# Patient Record
Sex: FEMALE | Race: WHITE | NOT HISPANIC OR LATINO | Employment: OTHER | URBAN - METROPOLITAN AREA
[De-identification: names, ages, dates, MRNs, and addresses within clinical notes are randomized per-mention and may not be internally consistent; named-entity substitution may affect disease eponyms.]

---

## 2017-01-11 DIAGNOSIS — F32.A DEPRESSION: Chronic | ICD-10-CM

## 2017-01-11 RX ORDER — MIRTAZAPINE 15 MG/1
TABLET, FILM COATED ORAL
Qty: 30 TABLET | Refills: 1 | Status: SHIPPED | OUTPATIENT
Start: 2017-01-11 | End: 2017-03-29 | Stop reason: SDUPTHER

## 2017-01-25 ENCOUNTER — OFFICE VISIT (OUTPATIENT)
Dept: INTERNAL MEDICINE | Age: 82
End: 2017-01-25

## 2017-01-25 VITALS
DIASTOLIC BLOOD PRESSURE: 80 MMHG | HEART RATE: 76 BPM | WEIGHT: 147 LBS | SYSTOLIC BLOOD PRESSURE: 128 MMHG | TEMPERATURE: 97.4 F | OXYGEN SATURATION: 95 % | HEIGHT: 65 IN | BODY MASS INDEX: 24.49 KG/M2

## 2017-01-25 DIAGNOSIS — K58.0 IRRITABLE BOWEL SYNDROME WITH DIARRHEA: Primary | Chronic | ICD-10-CM

## 2017-01-25 DIAGNOSIS — J30.9 ATOPIC RHINITIS: Chronic | ICD-10-CM

## 2017-01-25 DIAGNOSIS — K21.9 GASTROESOPHAGEAL REFLUX DISEASE, ESOPHAGITIS PRESENCE NOT SPECIFIED: Chronic | ICD-10-CM

## 2017-01-25 PROCEDURE — 99213 OFFICE O/P EST LOW 20 MIN: CPT | Performed by: INTERNAL MEDICINE

## 2017-01-25 RX ORDER — OLOPATADINE HYDROCHLORIDE 665 UG/1
2 SPRAY NASAL 2 TIMES DAILY
Qty: 1 BOTTLE | Refills: 5 | Status: SHIPPED | OUTPATIENT
Start: 2017-01-25 | End: 2018-06-12 | Stop reason: SDUPTHER

## 2017-01-25 RX ORDER — FLUTICASONE PROPIONATE 50 MCG
2 SPRAY, SUSPENSION (ML) NASAL DAILY
Qty: 1 EACH | Refills: 5 | Status: SHIPPED | OUTPATIENT
Start: 2017-01-25 | End: 2018-10-12 | Stop reason: SDUPTHER

## 2017-01-25 RX ORDER — LANSOPRAZOLE 30 MG/1
30 CAPSULE, DELAYED RELEASE ORAL EVERY MORNING
COMMUNITY
Start: 2017-01-25 | End: 2018-04-25

## 2017-01-25 NOTE — PROGRESS NOTES
"[place] B Fedde / 89 y.o. / female  01/25/2017    VITALS    Visit Vitals   • /80   • Pulse 76   • Temp 97.4 °F (36.3 °C)   • Ht 65\" (165.1 cm)   • Wt 147 lb (66.7 kg)   • SpO2 95%   • BMI 24.46 kg/m2     BP Readings from Last 3 Encounters:   01/25/17 128/80   10/25/16 124/60   04/25/16 120/62     Wt Readings from Last 3 Encounters:   01/25/17 147 lb (66.7 kg)   10/25/16 148 lb (67.1 kg)   04/25/16 144 lb 4.8 oz (65.5 kg)      Body mass index is 24.46 kg/(m^2).    CC:  Main reason(s) for today's visit: Irritable Bowel Syndrome and Heartburn      HPI:     ibs could not tolerate viberzi due to severe cramps  gerd stayed on lansoprazole instead of change to pantoprazole.  Needs refills on nasal sprays for allergies  ____________________________________________________________________    ASSESSMENT & PLAN:    Problem List Items Addressed This Visit        High    Irritable bowel syndrome with diarrhea - Primary (Chronic)    Current Assessment & Plan     Could not tolerate Viberzi.         Relevant Medications    Eluxadoline (VIBERZI) 75 MG tablet    hyoscyamine (LEVBID) 0.375 MG 12 hr tablet       Medium    Gastroesophageal reflux disease (Chronic)    Current Assessment & Plan     Decided to stay on on lansoprazole.         Relevant Medications    pantoprazole (PROTONIX) 40 MG EC tablet    hyoscyamine (LEVBID) 0.375 MG 12 hr tablet       Low    Atopic rhinitis (Chronic)    Relevant Medications    olopatadine (PATANASE) 0.6 % solution nasal solution    fluticasone (FLONASE) 50 MCG/ACT nasal spray        No orders of the defined types were placed in this encounter.      Summary/Discussion:     ·       No Follow-up on file.    No future appointments.    ____________________________________________________________________    REVIEW OF SYSTEMS    Review of Systems  As noted per HPI  Constitutional neg   GI no pain or blood in stool, decreased diarrhea  Other: As noted per HPI      PHYSICAL EXAMINATION    Physical " Exam  Constitutional  No distress   Cardiovascular Rate  normal . Rhythm: regular . Heart sounds:  normal    Abdomen soft and nontender  Psychiatric  Alert. Judgment and thought content normal. Mood normal      REVIEWED DATA:    Labs:   Lab Results   Component Value Date     05/10/2016    K 5.2 05/10/2016    AST 24 04/25/2016    ALT 12 04/25/2016    BUN 43 (H) 05/10/2016    CREATININE 1.94 (H) 05/10/2016    CREATININE 1.86 (H) 04/25/2016    CREATININE 1.85 (H) 05/05/2015    EGFRIFNONA 22 (L) 05/10/2016    EGFRIFAFRI 26 (L) 05/10/2016       Lab Results   Component Value Date    GLU 74 05/10/2016    GLU 87 04/25/2016    GLU 95 05/05/2015       Lab Results   Component Value Date    LDL 92 04/25/2016    HDL 58 04/25/2016    TRIG 130 04/25/2016    CHOLHDLRATIO 3.0 04/25/2016       Lab Results   Component Value Date    TSH 3.020 04/25/2016    FREET4 1.22 04/25/2016          Lab Results   Component Value Date    WBC 11-30 (A) 03/10/2016    HGB 12.0 05/05/2015     05/05/2015        Imaging:        Medical Tests:        Summary of old records / correspondence / consultant report:        Request outside records:          ALLERGIES:    Sulfa antibiotics; Azithromycin; Risedronate sodium; Ciprofloxacin hcl; Hydrocodone-acetaminophen; and Risedronate    MEDICATIONS:  Current Outpatient Prescriptions   Medication Sig Dispense Refill   • acetaminophen (TYLENOL) 325 MG tablet Take 650 mg by mouth every 6 (six) hours.     • amLODIPine (NORVASC) 10 MG tablet TAKE 1 TABLET NIGHTLY 90 tablet 1   • B Complex Vitamins (VITAMIN B COMPLEX) tablet Take  by mouth daily.     • Cranberry 1000 MG capsule Take  by mouth.     • fluticasone (FLONASE) 50 MCG/ACT nasal spray 2 sprays into each nostril Daily. 1 each 5   • furosemide (LASIX) 40 MG tablet 1 (ONE) TABLET, ORAL, DAILY  5   • hyoscyamine (LEVBID) 0.375 MG 12 hr tablet TAKE 1 TABLET BY MOUTH TWICE A DAY 60 tablet 5   • levETIRAcetam (KEPPRA) 250 MG tablet TAKE 1 TABLET TWICE  DAILY. 180 tablet 0   • levothyroxine (SYNTHROID, LEVOTHROID) 25 MCG tablet Take 1 tablet by mouth every morning before breakfast.     • mirtazapine (REMERON) 15 MG tablet TAKE 1 TABLET BY MOUTH IN THE EVENING AT BEDTIME 30 tablet 1   • Multiple Vitamins-Minerals (VITEYES AREDS ADVANCED) capsule Take  by mouth daily.     • olopatadine (PATANASE) 0.6 % solution nasal solution 2 sprays into each nostril 2 (Two) Times a Day. 1 bottle 5   • Probiotic Product (PROBIOTIC DAILY PO) Take  by mouth.     • simvastatin (ZOCOR) 20 MG tablet Take 1 tablet by mouth Every Night. 90 tablet 1   • Eluxadoline (VIBERZI) 75 MG tablet Take 1 tablet by mouth 2 (Two) Times a Day. 60 tablet 0   • pantoprazole (PROTONIX) 40 MG EC tablet Take 1 tablet by mouth Daily. 30 tablet 3     No current facility-administered medications for this visit.        [facility]    The following portions of the patient's history were reviewed and updated as appropriate: Allergies / Current Medications / Past Medical History / Surgical History / Social History / Family History    PROBLEM LIST:    Patient Active Problem List   Diagnosis   • Atopic rhinitis   • Anemia   • Stenosis of carotid artery   • Chronic kidney disease   • Depression   • Gastroesophageal reflux disease   • Hypertension   • Hyperlipidemia   • Hypothyroidism   • Irritable bowel syndrome with diarrhea   • Restless legs syndrome   • Vitamin D deficiency       PAST MEDICAL HX:    Past Medical History   Diagnosis Date   • Allergic    • Anemia    • Arthritis    • Cataract    • Cerebral hemorrhage 8/15/2014   • Closed fracture of proximal end of humerus 5/15/2015   • Depression    • Diverticulosis of intestine 3/10/2016   • GERD (gastroesophageal reflux disease)    • Headache    • HL (hearing loss)    • Hyperlipidemia    • Hypertension    • Irritable bowel syndrome    • Low back pain    • Osteopenia    • Renal insufficiency    • Seizures    • Stroke    • Tremor    • Urinary tract infection    • Visual  impairment      magular degeneration       PAST SURGICAL HX:    Past Surgical History   Procedure Laterality Date   • Cholecystectomy     • Eye surgery       cataracts removed   • Hysterectomy     • Tonsillectomy     • Colonoscopy         SOCIAL HX:    Social History     Social History   • Marital status:      Spouse name: N/A   • Number of children: N/A   • Years of education: N/A     Social History Main Topics   • Smoking status: Never Smoker   • Smokeless tobacco: Never Used   • Alcohol use No   • Drug use: No   • Sexual activity: No     Other Topics Concern   • None     Social History Narrative   • None       FAMILY HX:    Family History   Problem Relation Age of Onset   • Diabetes Brother    • Heart disease Mother

## 2017-01-25 NOTE — MR AVS SNAPSHOT
Lianna Hawthorne   1/25/2017 1:20 PM   Office Visit    Dept Phone:  [phone]   Encounter #:  [id number]    Provider:  Mike Ricketts MD   Department:  [facility]                Your Full Care Plan              Today's Medication Changes          These changes are accurate as of: 1/25/17  1:39 PM.  If you have any questions, ask your nurse or doctor.               Medication(s)that have changed:     fluticasone 50 MCG/ACT nasal spray   Commonly known as:  FLONASE   2 sprays into each nostril Daily.   What changed:  how much to take   Changed by:  Mike Ricketts MD       olopatadine 0.6 % solution nasal solution   Commonly known as:  PATANASE   2 sprays into each nostril 2 (Two) Times a Day.   What changed:    - how much to take  - when to take this   Changed by:  Mike Ricketts MD         Stop taking medication(s)listed here:     Eluxadoline 75 MG tablet   Commonly known as:  VIBERZI   Stopped by:  Mike Ricketts MD           pantoprazole 40 MG EC tablet   Commonly known as:  PROTONIX   Stopped by:  Mike Ricketts MD                Where to Get Your Medications      These medications were sent to [facility]/pharmacy #[id number] [place], [place] - [address] [facility] AT [facility] - [phone] [facility] [phone] [place] [address], [place] [address]     Phone:  [phone]     fluticasone 50 MCG/ACT nasal spray    olopatadine 0.6 % solution nasal solution                  Your Updated Medication List          This list is accurate as of: 1/25/17  1:39 PM.  Always use your most recent med list.                acetaminophen 325 MG tablet   Commonly known as:  TYLENOL       amLODIPine 10 MG tablet   Commonly known as:  NORVASC   TAKE 1 TABLET NIGHTLY       Cranberry 1000 MG capsule       fluticasone 50 MCG/ACT nasal spray   Commonly known as:  FLONASE   2 sprays into each nostril Daily.       furosemide 40 MG tablet   Commonly known as:  LASIX       hyoscyamine 0.375 MG  12 hr tablet   Commonly known as:  LEVBID   TAKE 1 TABLET BY MOUTH TWICE A DAY       lansoprazole 30 MG capsule   Commonly known as:  PREVACID       levETIRAcetam 250 MG tablet   Commonly known as:  KEPPRA   TAKE 1 TABLET TWICE DAILY.       levothyroxine 25 MCG tablet   Commonly known as:  SYNTHROID, LEVOTHROID       mirtazapine 15 MG tablet   Commonly known as:  REMERON   TAKE 1 TABLET BY MOUTH IN THE EVENING AT BEDTIME       olopatadine 0.6 % solution nasal solution   Commonly known as:  PATANASE   2 sprays into each nostril 2 (Two) Times a Day.       PROBIOTIC DAILY PO       simvastatin 20 MG tablet   Commonly known as:  ZOCOR   Take 1 tablet by mouth Every Night.       Vitamin B Complex tablet       VITEYES AREDS ADVANCED capsule               You Were Diagnosed With        Codes Comments    Irritable bowel syndrome with diarrhea    -  Primary ICD-10-CM: K58.0  ICD-9-CM: 564.1     Gastroesophageal reflux disease, esophagitis presence not specified     ICD-10-CM: K21.9  ICD-9-CM: 530.81     Atopic rhinitis     ICD-10-CM: J30.9  ICD-9-CM: 477.9       Instructions     None    Patient Instructions History      Upcoming Appointments     Visit Type Date Time Department    OFFICE VISIT 1/25/2017  1:20 PM [employer]    INIT MEDICARE WELLNESS VISIT 5/24/2017  1:20 PM [facility] [employer]      [employer] Signup     Our records indicate that you have an active [employer] account.    You can view your After Visit Summary by going to [employer] and logging in with your [employer] username and password.  If you don't have a [employer] username and password but a parent or guardian has access to your record, the parent or guardian should login with their own [employer] username and password and access your record to view the After Visit Summary.    If you have questions, you can email [email or web address] or call [phone] to talk to our [employer] staff.  Remember, [employer] is NOT to be used for  "urgent needs.  For medical emergencies, dial 911.               Other Info from Your Visit           Your Appointments     May 24, 2017  1:20 PM EDT   Initial Medicare Wellness Visit with Mike Ricketts MD   [facility] (--)    [address] [place] [id number] [phone]              Allergies     Sulfa Antibiotics  Rash    Azithromycin  Diarrhea    Risedronate Sodium      Other reaction(s): Other (See Comments)  ACHES    Ciprofloxacin Hcl  GI Intolerance    Hydrocodone-acetaminophen      Risedronate        Reason for Visit     Irritable Bowel Syndrome     Heartburn           Vital Signs     Blood Pressure Pulse Temperature Height Weight Oxygen Saturation    128/80 76 97.4 °F (36.3 °C) 65\" (165.1 cm) 147 lb (66.7 kg) 95%    Body Mass Index Smoking Status                24.46 kg/m2 Never Smoker          Problems and Diagnoses Noted     Nasal inflammation due to allergen    Acid reflux disease    Irritable bowel syndrome with diarrhea        "

## 2017-01-26 ENCOUNTER — TELEPHONE (OUTPATIENT)
Dept: INTERNAL MEDICINE | Age: 82
End: 2017-01-26

## 2017-02-02 DIAGNOSIS — E03.9 HYPOTHYROIDISM: ICD-10-CM

## 2017-02-02 RX ORDER — LEVOTHYROXINE SODIUM 0.03 MG/1
TABLET ORAL
Qty: 30 TABLET | Refills: 3 | Status: SHIPPED | OUTPATIENT
Start: 2017-02-02 | End: 2017-05-15 | Stop reason: SDUPTHER

## 2017-02-03 DIAGNOSIS — K58.0 IRRITABLE BOWEL SYNDROME WITH DIARRHEA: ICD-10-CM

## 2017-02-03 RX ORDER — HYOSCYAMINE SULFATE EXTENDED-RELEASE 0.38 MG/1
TABLET ORAL
Qty: 60 TABLET | Refills: 3 | Status: SHIPPED | OUTPATIENT
Start: 2017-02-03 | End: 2017-04-03 | Stop reason: SDUPTHER

## 2017-02-16 RX ORDER — AMLODIPINE BESYLATE 10 MG/1
TABLET ORAL
Qty: 90 TABLET | Refills: 1 | Status: SHIPPED | OUTPATIENT
Start: 2017-02-16 | End: 2017-08-25 | Stop reason: SDUPTHER

## 2017-02-16 RX ORDER — LEVETIRACETAM 250 MG/1
TABLET ORAL
Qty: 180 TABLET | Refills: 0 | Status: SHIPPED | OUTPATIENT
Start: 2017-02-16 | End: 2017-05-18 | Stop reason: SDUPTHER

## 2017-03-29 DIAGNOSIS — F32.A DEPRESSION, UNSPECIFIED DEPRESSION TYPE: ICD-10-CM

## 2017-03-29 RX ORDER — MIRTAZAPINE 15 MG/1
15 TABLET, FILM COATED ORAL NIGHTLY
Qty: 30 TABLET | Refills: 1 | Status: SHIPPED | OUTPATIENT
Start: 2017-03-29 | End: 2017-05-18 | Stop reason: SDUPTHER

## 2017-04-03 DIAGNOSIS — K58.0 IRRITABLE BOWEL SYNDROME WITH DIARRHEA: ICD-10-CM

## 2017-04-03 RX ORDER — HYOSCYAMINE SULFATE EXTENDED-RELEASE 0.38 MG/1
0.38 TABLET ORAL 2 TIMES DAILY
Qty: 180 TABLET | Refills: 1 | Status: SHIPPED | OUTPATIENT
Start: 2017-04-03 | End: 2017-11-02 | Stop reason: SDUPTHER

## 2017-04-09 DIAGNOSIS — E78.5 HYPERLIPIDEMIA, UNSPECIFIED HYPERLIPIDEMIA TYPE: ICD-10-CM

## 2017-04-10 RX ORDER — SIMVASTATIN 20 MG
TABLET ORAL
Qty: 90 TABLET | Refills: 1 | Status: SHIPPED | OUTPATIENT
Start: 2017-04-10 | End: 2017-10-08 | Stop reason: SDUPTHER

## 2017-04-15 ENCOUNTER — APPOINTMENT (OUTPATIENT)
Dept: GENERAL RADIOLOGY | Facility: HOSPITAL | Age: 82
End: 2017-04-15

## 2017-04-15 PROCEDURE — 73630 X-RAY EXAM OF FOOT: CPT | Performed by: GENERAL PRACTICE

## 2017-04-15 PROCEDURE — 73610 X-RAY EXAM OF ANKLE: CPT | Performed by: GENERAL PRACTICE

## 2017-05-15 DIAGNOSIS — E03.9 HYPOTHYROIDISM, UNSPECIFIED TYPE: ICD-10-CM

## 2017-05-15 RX ORDER — LEVOTHYROXINE SODIUM 0.03 MG/1
25 TABLET ORAL EVERY MORNING
Qty: 30 TABLET | Refills: 2 | Status: SHIPPED | OUTPATIENT
Start: 2017-05-15 | End: 2017-07-14 | Stop reason: SDUPTHER

## 2017-05-18 DIAGNOSIS — F32.A DEPRESSION, UNSPECIFIED DEPRESSION TYPE: ICD-10-CM

## 2017-05-18 RX ORDER — MIRTAZAPINE 15 MG/1
15 TABLET, FILM COATED ORAL NIGHTLY
Qty: 30 TABLET | Refills: 1 | Status: SHIPPED | OUTPATIENT
Start: 2017-05-18 | End: 2017-07-21 | Stop reason: SDUPTHER

## 2017-05-18 RX ORDER — LEVETIRACETAM 250 MG/1
TABLET ORAL
Qty: 180 TABLET | Refills: 0 | Status: SHIPPED | OUTPATIENT
Start: 2017-05-18 | End: 2017-08-12 | Stop reason: SDUPTHER

## 2017-05-24 ENCOUNTER — OFFICE VISIT (OUTPATIENT)
Dept: INTERNAL MEDICINE | Age: 82
End: 2017-05-24

## 2017-05-24 VITALS
BODY MASS INDEX: 24.66 KG/M2 | WEIGHT: 148 LBS | HEIGHT: 65 IN | SYSTOLIC BLOOD PRESSURE: 138 MMHG | HEART RATE: 80 BPM | DIASTOLIC BLOOD PRESSURE: 64 MMHG | TEMPERATURE: 97.5 F | OXYGEN SATURATION: 98 %

## 2017-05-24 DIAGNOSIS — Z23 NEED FOR VACCINATION FOR STREP PNEUMONIAE: ICD-10-CM

## 2017-05-24 DIAGNOSIS — Z00.00 MEDICARE ANNUAL WELLNESS VISIT, INITIAL: Primary | ICD-10-CM

## 2017-05-24 PROCEDURE — G0009 ADMIN PNEUMOCOCCAL VACCINE: HCPCS | Performed by: INTERNAL MEDICINE

## 2017-05-24 PROCEDURE — 90732 PPSV23 VACC 2 YRS+ SUBQ/IM: CPT | Performed by: INTERNAL MEDICINE

## 2017-05-24 PROCEDURE — G0438 PPPS, INITIAL VISIT: HCPCS | Performed by: INTERNAL MEDICINE

## 2017-07-14 DIAGNOSIS — E03.9 HYPOTHYROIDISM, UNSPECIFIED TYPE: ICD-10-CM

## 2017-07-14 RX ORDER — LEVOTHYROXINE SODIUM 0.03 MG/1
TABLET ORAL
Qty: 30 TABLET | Refills: 5 | Status: SHIPPED | OUTPATIENT
Start: 2017-07-14 | End: 2017-10-23 | Stop reason: SDUPTHER

## 2017-07-21 ENCOUNTER — TELEPHONE (OUTPATIENT)
Dept: INTERNAL MEDICINE | Age: 82
End: 2017-07-21

## 2017-07-21 DIAGNOSIS — F32.A DEPRESSION, UNSPECIFIED DEPRESSION TYPE: ICD-10-CM

## 2017-07-21 RX ORDER — MIRTAZAPINE 15 MG/1
15 TABLET, FILM COATED ORAL NIGHTLY
Qty: 30 TABLET | Refills: 1 | Status: SHIPPED | OUTPATIENT
Start: 2017-07-21 | End: 2017-09-20 | Stop reason: SDUPTHER

## 2017-07-21 NOTE — TELEPHONE ENCOUNTER
Katiuska Boykin is calling requesting a rx refill of mirtazapine 15mg that was prescribed to the pt for depression while at [facility] [place] approx 2 y/ago. P/Katiuska the prescribing provider will no longer refill the med & Katiuska is asking if  will take it over.    Pt is due for a refill.    Pls call Katiuska #[phone].

## 2017-07-21 NOTE — TELEPHONE ENCOUNTER
Pt's daughter called regarding Rx for mirtazapine 15mg. Previously prescribed by Dr. Marrero. Last RF was 5/18/2017 #30 +1. Pt's daughter is asking if Dr. Ricketts will take over Rx.    Please advise.    KD

## 2017-08-14 RX ORDER — LEVETIRACETAM 250 MG/1
TABLET ORAL
Qty: 180 TABLET | Refills: 0 | Status: SHIPPED | OUTPATIENT
Start: 2017-08-14 | End: 2017-11-15 | Stop reason: SDUPTHER

## 2017-08-22 ENCOUNTER — TELEPHONE (OUTPATIENT)
Dept: INTERNAL MEDICINE | Age: 82
End: 2017-08-22

## 2017-08-22 NOTE — TELEPHONE ENCOUNTER
Pt's daughter, Katiuska called stating pt is complaining of a possible uti. Urinating a lot and burning when urinating. She denied pt having any other symptoms.  Katiuska's # [phone]  Thanks SP

## 2017-08-23 ENCOUNTER — OFFICE VISIT (OUTPATIENT)
Dept: INTERNAL MEDICINE | Age: 82
End: 2017-08-23

## 2017-08-23 VITALS
OXYGEN SATURATION: 98 % | HEART RATE: 122 BPM | WEIGHT: 155.1 LBS | SYSTOLIC BLOOD PRESSURE: 124 MMHG | HEIGHT: 65 IN | DIASTOLIC BLOOD PRESSURE: 72 MMHG | TEMPERATURE: 97.9 F | BODY MASS INDEX: 25.84 KG/M2

## 2017-08-23 DIAGNOSIS — N18.4 CHRONIC KIDNEY DISEASE, STAGE 4 (SEVERE) (HCC): ICD-10-CM

## 2017-08-23 DIAGNOSIS — N39.0 ACUTE LOWER UTI (URINARY TRACT INFECTION): ICD-10-CM

## 2017-08-23 DIAGNOSIS — R35.0 URINARY FREQUENCY: Primary | ICD-10-CM

## 2017-08-23 LAB
BILIRUB BLD-MCNC: NEGATIVE MG/DL
CLARITY, POC: ABNORMAL
COLOR UR: YELLOW
GLUCOSE UR STRIP-MCNC: NEGATIVE MG/DL
KETONES UR QL: NEGATIVE
LEUKOCYTE EST, POC: ABNORMAL
NITRITE UR-MCNC: POSITIVE MG/ML
PH UR: 5.5 [PH] (ref 5–8)
PROT UR STRIP-MCNC: ABNORMAL MG/DL
RBC # UR STRIP: ABNORMAL /UL
SP GR UR: 1.01 (ref 1–1.03)
UROBILINOGEN UR QL: NORMAL

## 2017-08-23 PROCEDURE — 99214 OFFICE O/P EST MOD 30 MIN: CPT | Performed by: NURSE PRACTITIONER

## 2017-08-23 PROCEDURE — 81003 URINALYSIS AUTO W/O SCOPE: CPT | Performed by: NURSE PRACTITIONER

## 2017-08-23 RX ORDER — CEFDINIR 300 MG/1
300 CAPSULE ORAL DAILY
Qty: 7 CAPSULE | Refills: 0 | Status: SHIPPED | OUTPATIENT
Start: 2017-08-23 | End: 2017-08-30

## 2017-08-23 RX ORDER — CEFDINIR 300 MG/1
300 CAPSULE ORAL DAILY
Qty: 7 CAPSULE | Refills: 0 | Status: SHIPPED | OUTPATIENT
Start: 2017-08-23 | End: 2017-08-23 | Stop reason: SDUPTHER

## 2017-08-23 NOTE — PROGRESS NOTES
"Subjective   Lianna Hawthorne is a 89 y.o. female.     Urinary Frequency    This is a new problem. Episode onset: 5 days ago. The quality of the pain is described as aching. There has been no fever. She is not sexually active. Associated symptoms include frequency. Pertinent negatives include no chills, flank pain, hematuria, nausea or vomiting. Associated symptoms comments: nocturia. CKD stage 4    Sees Dr. Layton for nephrology, has stage IV kidney disease (7/18/17)- labs performed at that time.   Daughter reports that patient \"takes longer to process\", slightly confused since symptoms started on Friday.     The following portions of the patient's history were reviewed and updated as appropriate: allergies, current medications, past family history, past medical history, past social history, past surgical history and problem list.    Review of Systems   Constitutional: Positive for fatigue. Negative for chills and fever.   Gastrointestinal: Negative for nausea and vomiting.   Endocrine: Positive for polyuria (at nighttime, acute onset). Negative for polydipsia and polyphagia.   Genitourinary: Positive for frequency and pelvic pain. Negative for decreased urine volume, difficulty urinating, dysuria, enuresis, flank pain, hematuria, vaginal discharge and vaginal pain.   Musculoskeletal: Positive for back pain (chronic back pain).       Objective   Physical Exam   Constitutional: Vital signs are normal. She appears well-developed and well-nourished. She is cooperative. She does not appear ill. No distress.   Cardiovascular: Normal rate, regular rhythm and normal heart sounds.    No murmur heard.  Pulmonary/Chest: Effort normal and breath sounds normal.   Abdominal: There is no CVA tenderness.   Neurological: She is alert.   Skin: Skin is warm, dry and intact.   Psychiatric: She has a normal mood and affect. Her speech is normal and behavior is normal. Judgment and thought content normal. Cognition and memory are normal. "   Nursing note and vitals reviewed.      Assessment/Plan   Problems Addressed this Visit        Genitourinary    Chronic kidney disease      Other Visit Diagnoses     Urinary frequency    -  Primary    Relevant Orders    POCT urinalysis dipstick, automated (Completed)    Acute lower UTI (urinary tract infection)        Relevant Medications    cefdinir (OMNICEF) 300 MG capsule    Other Relevant Orders    Urine culture (clean catch)        1. Urinary frequency    - POCT urinalysis dipstick, automated    2. Acute lower UTI (urinary tract infection)  Medication dosage adjusted for decreased GFR ( GFR calculated at 20 ml/min per most recent creatinine level of 2.29 provided by daughter). Daughter and patient given strict ER indications for worsening symptoms or new symptoms. Instructed daughter to contact me via [employer] in 3-4 days to let me know how patient is doing.     - Urine culture (clean catch)  - cefdinir (OMNICEF) 300 MG capsule; Take 1 capsule by mouth Daily for 7 days.  Dispense: 7 capsule; Refill: 0    3. Chronic kidney disease, stage 4 (severe)

## 2017-08-23 NOTE — PATIENT INSTRUCTIONS
GO TO EMERGENCY ROOM FOR WORSENING PAIN, FEVER, VOMITING, LETHARGY/ INCREASED CONFUSION, INABILITY TO URINATE, STOPS PEEING.     Urinary Tract Infection, Adult  A urinary tract infection (UTI) is an infection of any part of the urinary tract, which includes the kidneys, ureters, bladder, and urethra. These organs make, store, and get rid of urine in the body. UTI can be a bladder infection (cystitis) or kidney infection (pyelonephritis).  CAUSES  This infection may be caused by fungi, viruses, or bacteria. Bacteria are the most common cause of UTIs. This condition can also be caused by repeated incomplete emptying of the bladder during urination.   RISK FACTORS  This condition is more likely to develop if:   · You ignore your need to urinate or hold urine for long periods of time.  · You do not empty your bladder completely during urination.  · You wipe back to front after urinating or having a bowel movement, if you are female.  · You are uncircumcised, if you are male.    · You are constipated.  · You have a urinary catheter that stays in place (indwelling).  · You have a weak defense (immune) system.  · You have a medical condition that affects your bowels, kidneys, or bladder.  · You have diabetes.  · You take antibiotic medicines frequently or for long periods of time, and the antibiotics no longer work well against certain types of infections (antibiotic resistance).  · You take medicines that irritate your urinary tract.  · You are exposed to chemicals that irritate your urinary tract.  · You are female.  SYMPTOMS   Symptoms of this condition include:   · Fever.    · Frequent urination or passing small amounts of urine frequently.    · Needing to urinate urgently.    · Pain or burning with urination.    · Urine that smells bad or unusual.    · Cloudy urine.    · Pain in the lower abdomen or back.    · Trouble urinating.    · Blood in the urine.    · Vomiting or being less hungry than normal.     · Diarrhea or  abdominal pain.    · Vaginal discharge, if you are female.  DIAGNOSIS  This condition is diagnosed with a medical history and physical exam. You will also need to provide a urine sample to test your urine. Other tests may be done, including:    · Blood tests.    · Sexually transmitted disease (STD) testing.     If you have had more than one UTI, a cystoscopy or imaging studies may be done to determine the cause of the infections.   TREATMENT   Treatment for this condition often includes a combination of two or more of the following:   · Antibiotic medicine.    · Other medicines to treat less common causes of UTI.    · Over-the-counter medicines to treat pain.    · Drinking enough water to stay hydrated.  HOME CARE INSTRUCTIONS  · Take over-the-counter and prescription medicines only as told by your health care provider.  · If you were prescribed an antibiotic, take it as told by your health care provider. Do not stop taking the antibiotic even if you start to feel better.  · Avoid alcohol, caffeine, tea, and carbonated beverages. They can irritate your bladder.  · Drink enough fluid to keep your urine clear or pale yellow.  · Keep all follow-up visits as told by your health care provider. This is important.  · Make sure to:    Empty your bladder often and completely. Do not hold urine for long periods of time.    Empty your bladder before and after sex.    Wipe from front to back after a bowel movement if you are female. Use each tissue one time when you wipe.  SEEK MEDICAL CARE IF:   · You have back pain.    · You have a fever.  · You feel nauseous or vomit.    · Your symptoms do not get better after 3 days.     · Your symptoms go away and then return.  SEEK IMMEDIATE MEDICAL CARE IF:   · You have severe back pain or lower abdominal pain.    · You are vomiting and cannot keep down any medicines or water.     This information is not intended to replace advice given to you by your health care provider. Make sure you  discuss any questions you have with your health care provider.     Document Released: 09/27/2006 Document Revised: 04/10/2017 Document Reviewed: 11/07/2016  Elsevier Interactive Patient Education ©2017 Elsevier Inc.

## 2017-08-25 DIAGNOSIS — I10 ESSENTIAL HYPERTENSION: Primary | ICD-10-CM

## 2017-08-25 NOTE — TELEPHONE ENCOUNTER
----- Message from Lianna Hawthorne sent at 8/25/2017  1:30 PM EDT -----  Regarding: Non-Urgent Medical Question  Contact: [phone]  Dear Rina Polo and Dr. Ricketts,  I am writing to follow-up on the appointment my mother, Lianna Hawthorne, had with Rina on Wednesday, the 23rd, regarding her urinary tract infection.  She started the medication prescribed for the UTI on Wednesday afternoon. She slept through the night Wednesday and had a better day on Thursday. She took her second pill on Thursday. Unfortunately, Thursday overnight for Mom saw the return of the frequent urination through the night with burning some of the time.  Understandably she is very tired today.  In speaking with her on the phone I hear no signs of confusion.      Mom tells me she has had three bowel movements here on Friday morning. They are soft but not yet at the level of diarrhea. If over the weekend she does go into diarrhea can she take Pepto Bismal or Immodium to stop the diarrhea or does she stop the medication?    When should Mom expect to see some relief in the UTI symptoms?    Thank you all for the care you provide my mother.    Katiuska Smith

## 2017-08-26 LAB
BACTERIA UR CULT: ABNORMAL
BACTERIA UR CULT: ABNORMAL
OTHER ANTIBIOTIC SUSC ISLT: ABNORMAL

## 2017-08-28 RX ORDER — AMLODIPINE BESYLATE 10 MG/1
TABLET ORAL
Qty: 90 TABLET | Refills: 1 | Status: SHIPPED | OUTPATIENT
Start: 2017-08-28 | End: 2018-01-05

## 2017-08-29 NOTE — TELEPHONE ENCOUNTER
I'm not sure except that this is a f/u from the pt's daughter regarding an appt with CURTIS Flynn. I don't know why it went to you.    KD

## 2017-09-06 ENCOUNTER — TELEPHONE (OUTPATIENT)
Dept: INTERNAL MEDICINE | Age: 82
End: 2017-09-06

## 2017-09-06 NOTE — TELEPHONE ENCOUNTER
Patient is on antibiotics for UTI. Patient has been having diarrhea  Friday and Tuesday evening. She has treated it with imodium and pepto bismol. Daughter charla like to know if there is anything else she should be doing? She patient continue to take antibiotics?

## 2017-09-06 NOTE — TELEPHONE ENCOUNTER
Called pt back to ask which antibiotic she was taking, and she stated that she was taking omnicef which was suggested by Dr Ricketts, but she was seen by angela on 8/23 and treated for UTI. Pt is finished with antibiotic and only had a couple bouts of diarrhea once Friday which was stopped by the pepto / imodium and once yesterday which also was stopped. Let pt know to keep hydrated. Daughter was a little worried about the pt because of her age and wondered if there was anything else she should be doing at this time. MARIANA

## 2017-09-11 NOTE — TELEPHONE ENCOUNTER
P/Katiuska(shalini) pt had diarrhea on Mukesh 9/10/17 before taking pepto bismol & then diarrhea stopped.    Pls advise.    Katiuska can be reached #[phone].

## 2017-09-12 ENCOUNTER — OFFICE VISIT (OUTPATIENT)
Dept: INTERNAL MEDICINE | Age: 82
End: 2017-09-12

## 2017-09-12 VITALS
HEIGHT: 65 IN | SYSTOLIC BLOOD PRESSURE: 158 MMHG | TEMPERATURE: 98.1 F | HEART RATE: 82 BPM | WEIGHT: 146 LBS | DIASTOLIC BLOOD PRESSURE: 60 MMHG | OXYGEN SATURATION: 98 % | BODY MASS INDEX: 24.32 KG/M2

## 2017-09-12 DIAGNOSIS — K58.0 IRRITABLE BOWEL SYNDROME WITH DIARRHEA: Primary | Chronic | ICD-10-CM

## 2017-09-12 PROCEDURE — 99214 OFFICE O/P EST MOD 30 MIN: CPT | Performed by: INTERNAL MEDICINE

## 2017-09-12 NOTE — PROGRESS NOTES
"Archer B Fedde / 89 y.o. / female  09/12/2017    VITALS    /60  Pulse 82  Temp 98.1 °F (36.7 °C)  Ht 65\" (165.1 cm)  Wt 146 lb (66.2 kg)  SpO2 98%  BMI 24.3 kg/m2  BP Readings from Last 3 Encounters:   09/12/17 158/60   08/23/17 124/72   05/24/17 138/64     Wt Readings from Last 3 Encounters:   09/12/17 146 lb (66.2 kg)   08/23/17 155 lb 1.6 oz (70.4 kg)   05/24/17 148 lb (67.1 kg)      Body mass index is 24.3 kg/(m^2).      CC:  Main reason(s) for today's visit: Diarrhea (since september first / had 3 other episodes)      HPI:     Symptom: diarrhea  -Location:   -Timing: random, intermittent  -Duration: >1 wk  -Context: h/o cefdinir use for UTI in August  -Quality: watery, non-bloody  -Severity: moderate/severe  -Modifying Factors: sometimes after eating, no particular food  -Associated S/S: no abd pain, melena, blood, wt loss, fever       Patient Care Team:  Mike Ricketts MD as PCP - General  Ester Villegas DPM as PCP - Claims Attributed  Brice Zambrano MD as Consulting Physician (Dermatology)  Ester Villegas DPM as Consulting Physician (Podiatry)  Chad Marino MD as Consulting Physician (Ophthalmology)  Jl Layton MD as Consulting Physician (Nephrology)  ____________________________________________________________________    ASSESSMENT & PLAN:    1. Irritable bowel syndrome with diarrhea  Handout given for IBS, counseled on diet, keep diary  Recommended using Metamucil daily  Counseled on stress mgmt  - Clostridium Difficile EIA  - Fecal Leukocytes  - Stool Culture       Summary/Discussion:     ·     No Follow-up on file.    Future Appointments  Date Time Provider Department Center   11/30/2017 9:20 AM Mike Ricketts MD [facility] None     ____________________________________________________________________    REVIEW OF SYSTEMS    Review of Systems  Other: As noted per HPI  No fever, loss  Psych increased stress due to son in [place]  GI no abd pain or blood   " negative    PHYSICAL EXAMINATION    Physical Exam   Constitutional: She does not have a sickly appearance. No distress.   Abdominal: Soft. Bowel sounds are normal. She exhibits no mass. Distention: mild. There is no tenderness. There is no rebound and no guarding. No hernia.   Neurological: She is alert.   Skin: No pallor.   Psychiatric: She has a normal mood and affect. Judgment normal.         REVIEWED DATA:    Labs:       Imaging:        Medical Tests:        Summary of old records / correspondence / consultant report:        Request outside records:       Allergies   Allergen Reactions   • Sulfa Antibiotics Rash   • Azithromycin Diarrhea   • Risedronate Sodium      Other reaction(s): Other (See Comments)  ACHES   • Ciprofloxacin Hcl GI Intolerance   • Hydrocodone-Acetaminophen    • Risedronate         Current Outpatient Prescriptions   Medication Sig Dispense Refill   • acetaminophen (TYLENOL) 325 MG tablet Take 650 mg by mouth every 6 (six) hours.     • amLODIPine (NORVASC) 10 MG tablet TAKE 1 TABLET NIGHTLY 90 tablet 1   • B Complex Vitamins (VITAMIN B COMPLEX) tablet Take  by mouth daily.     • Cranberry 1000 MG capsule Take  by mouth Daily.     • fluticasone (FLONASE) 50 MCG/ACT nasal spray 2 sprays into each nostril Daily. 1 each 5   • furosemide (LASIX) 40 MG tablet 1 (ONE) TABLET, ORAL, DAILY  5   • hyoscyamine (LEVBID) 0.375 MG 12 hr tablet Take 1 tablet by mouth 2 (Two) Times a Day. 180 tablet 1   • lansoprazole (PREVACID) 30 MG capsule Take 1 capsule by mouth Daily.     • levETIRAcetam (KEPPRA) 250 MG tablet TAKE 1 TABLET TWICE DAILY. 180 tablet 0   • levothyroxine (SYNTHROID, LEVOTHROID) 25 MCG tablet TAKE 1 TABLET BY MOUTH EVERY MORNING. 30 tablet 5   • mirtazapine (REMERON) 15 MG tablet Take 1 tablet by mouth Every Night. 30 tablet 1   • olopatadine (PATANASE) 0.6 % solution nasal solution 2 sprays into each nostril 2 (Two) Times a Day. 1 bottle 5   • Probiotic Product (PROBIOTIC DAILY PO) Take  by  mouth.     • simvastatin (ZOCOR) 20 MG tablet TAKE 1 TABLET EVERY NIGHT 90 tablet 1   • Multiple Vitamins-Minerals (VITEYES AREDS ADVANCED) capsule Take  by mouth daily.     • mupirocin (BACTROBAN) 2 % ointment APPLY SPARINGLY TO THE BIOPSY AREA 2 TIMES A DAY  3     No current facility-administered medications for this visit.        PFSH:     The following portions of the patient's history were reviewed and updated as appropriate: Allergies / Current Medications / Past Medical History / Surgical History / Social History / Family History    Patient Active Problem List   Diagnosis   • Atopic rhinitis   • Anemia   • Stenosis of carotid artery   • Chronic kidney disease   • Depression   • Gastroesophageal reflux disease   • Hypertension   • Hyperlipidemia   • Hypothyroidism   • Irritable bowel syndrome with diarrhea   • Restless legs syndrome   • Vitamin D deficiency       Past Medical History:   Diagnosis Date   • Anemia    • Arthritis    • Cataract    • Cerebral hemorrhage 8/15/2014   • Closed fracture of proximal end of humerus 5/15/2015   • Diverticulosis of intestine 3/10/2016   • GERD (gastroesophageal reflux disease)    • Hyperlipidemia    • Irritable bowel syndrome    • Low back pain    • Osteopenia    • Renal insufficiency    • Seizures    • Stroke    • Tremor    • Visual impairment     magular degeneration       Past Surgical History:   Procedure Laterality Date   • CHOLECYSTECTOMY     • COLONOSCOPY     • EYE SURGERY      cataracts removed   • HYSTERECTOMY     • TONSILLECTOMY         Social History     Social History   • Marital status:      Spouse name: N/A   • Number of children: 3   • Years of education: N/A     Occupational History   • Retired      [employer] Teacher     Social History Main Topics   • Smoking status: Never Smoker   • Smokeless tobacco: Never Used   • Alcohol use No   • Drug use: No   • Sexual activity: No     Other Topics Concern   • None     Social History Narrative       Family History    Problem Relation Age of Onset   • Diabetes Brother    • Heart disease Mother    • Breast cancer Mother    • Kidney disease Other    • Breast cancer Other    • Diabetes Other    • Hypertension Other          **Dragon Disclaimer:   Much of this encounter note is an electronic transcription/translation of spoken language to printed text. The electronic translation of spoken language may permit erroneous, or at times, nonsensical words or phrases to be inadvertently transcribed. Although I have reviewed the note for such errors, some may still exist.

## 2017-09-18 ENCOUNTER — TELEPHONE (OUTPATIENT)
Dept: INTERNAL MEDICINE | Age: 82
End: 2017-09-18

## 2017-09-18 LAB
BACTERIA SPEC CULT: NORMAL
BACTERIA SPEC CULT: NORMAL
C DIFF TOX A+B STL QL IA: NEGATIVE
CAMPYLOBACTER STL CULT: NORMAL
E COLI SXT STL QL IA: NEGATIVE
SALM + SHIG STL CULT: NORMAL
WBC STL QL MICRO: NORMAL
WBC STL QL MICRO: NORMAL

## 2017-09-18 NOTE — TELEPHONE ENCOUNTER
Stool studies negative for C diff or other signs of infection.  If needed take imodium otc up to twice daily for diarrhea.

## 2017-09-18 NOTE — TELEPHONE ENCOUNTER
Pt's daughter, Rachel called to let Dr Ricketts know the pt had another severe diarrhea bout this morning (one occurrence). Pt took 2 doses Pepto liquid at 10a and hasn't had another occurrence.  Rachel's # [phone]  Thanks SP

## 2017-09-20 DIAGNOSIS — F32.A DEPRESSION, UNSPECIFIED DEPRESSION TYPE: ICD-10-CM

## 2017-09-21 RX ORDER — MIRTAZAPINE 15 MG/1
TABLET, FILM COATED ORAL
Qty: 30 TABLET | Refills: 2 | Status: SHIPPED | OUTPATIENT
Start: 2017-09-21 | End: 2017-12-13 | Stop reason: SDUPTHER

## 2017-10-08 DIAGNOSIS — E78.5 HYPERLIPIDEMIA, UNSPECIFIED HYPERLIPIDEMIA TYPE: ICD-10-CM

## 2017-10-09 RX ORDER — SIMVASTATIN 20 MG
TABLET ORAL
Qty: 90 TABLET | Refills: 1 | Status: SHIPPED | OUTPATIENT
Start: 2017-10-09 | End: 2018-01-05

## 2017-10-23 DIAGNOSIS — E03.9 HYPOTHYROIDISM, UNSPECIFIED TYPE: ICD-10-CM

## 2017-10-23 RX ORDER — LEVOTHYROXINE SODIUM 0.03 MG/1
25 TABLET ORAL EVERY MORNING
Qty: 90 TABLET | Refills: 0 | Status: SHIPPED | OUTPATIENT
Start: 2017-10-23 | End: 2018-02-15 | Stop reason: SDUPTHER

## 2017-11-02 DIAGNOSIS — K58.0 IRRITABLE BOWEL SYNDROME WITH DIARRHEA: ICD-10-CM

## 2017-11-03 RX ORDER — HYOSCYAMINE SULFATE EXTENDED-RELEASE 0.38 MG/1
TABLET ORAL
Qty: 180 TABLET | Refills: 1 | Status: SHIPPED | OUTPATIENT
Start: 2017-11-03 | End: 2018-01-05

## 2017-11-07 ENCOUNTER — APPOINTMENT (OUTPATIENT)
Dept: RADIATION ONCOLOGY | Facility: HOSPITAL | Age: 82
End: 2017-11-07

## 2017-11-07 ENCOUNTER — CONSULT (OUTPATIENT)
Dept: RADIATION ONCOLOGY | Facility: HOSPITAL | Age: 82
End: 2017-11-07

## 2017-11-07 VITALS
TEMPERATURE: 97 F | WEIGHT: 146 LBS | OXYGEN SATURATION: 95 % | BODY MASS INDEX: 24.3 KG/M2 | SYSTOLIC BLOOD PRESSURE: 158 MMHG | HEART RATE: 85 BPM | DIASTOLIC BLOOD PRESSURE: 75 MMHG | RESPIRATION RATE: 16 BRPM

## 2017-11-07 DIAGNOSIS — C44.311 BASAL CELL CARCINOMA OF ALA NASI: Primary | ICD-10-CM

## 2017-11-07 PROCEDURE — 77263 THER RADIOLOGY TX PLNG CPLX: CPT | Performed by: RADIOLOGY

## 2017-11-07 PROCEDURE — 77334 RADIATION TREATMENT AID(S): CPT | Performed by: RADIOLOGY

## 2017-11-07 PROCEDURE — 77290 THER RAD SIMULAJ FIELD CPLX: CPT | Performed by: RADIOLOGY

## 2017-11-07 PROCEDURE — 99204 OFFICE O/P NEW MOD 45 MIN: CPT | Performed by: RADIOLOGY

## 2017-11-07 PROCEDURE — G0463 HOSPITAL OUTPT CLINIC VISIT: HCPCS | Performed by: RADIOLOGY

## 2017-11-07 NOTE — PROGRESS NOTES
Subjective     Brice Zambrano MD     Diagnosis Plan   1. Basal cell carcinoma of ala nasi           Mrs. Angelo is a very pleasant 89-year-old white female, a very good condition for age, who has had multiple skin cancers treated by Dr. Zambrano.  In late July of this year she presented with a mass in the right side of the nose and with a typical history of scabbing, path    was positive for basal cell carcinoma  lateral and deep margins were involved measuring about 0.9 x 0.9 cm.  3 months later  in late October, 10/25  There was noted recurrent disease  measuring approximately 12 mm.  He went over the options with her and  we have been asked to evaluate her for definitive radiation therapy in this setting.                                  Review of Systems   Constitutional: Positive for fatigue.   HENT: Positive for hearing loss.    Eyes: Positive for visual disturbance.   Respiratory: Negative.    Cardiovascular: Negative.    Gastrointestinal: Negative.    Endocrine: Negative.    Genitourinary: Negative.    Musculoskeletal: Negative.    Skin: Positive for wound.   Neurological: Negative.          Past Medical History:   Diagnosis Date   • Anemia    • Arthritis    • Basal cell carcinoma of ala nasi 11/7/2017   • Cataract    • Cerebral hemorrhage 8/15/2014   • Closed fracture of proximal end of humerus 5/15/2015   • Diverticulosis of intestine 3/10/2016   • GERD (gastroesophageal reflux disease)    • Hyperlipidemia    • Irritable bowel syndrome    • Low back pain    • Osteopenia    • Renal insufficiency    • Seizures    • Stroke    • Tremor    • Visual impairment     magular degeneration         Past Surgical History:   Procedure Laterality Date   • CHOLECYSTECTOMY     • COLONOSCOPY     • EYE SURGERY      cataracts removed   • HYSTERECTOMY     • TONSILLECTOMY           Social History     Social History   • Marital status:      Spouse name: N/A   • Number of children: 3   • Years of education: N/A      Occupational History   • Retired      [employer] Teacher     Social History Main Topics   • Smoking status: Never Smoker   • Smokeless tobacco: Never Used   • Alcohol use No   • Drug use: No   • Sexual activity: No     Other Topics Concern   • None     Social History Narrative    Lives in [place] apartment [place] the [place].         Family History   Problem Relation Age of Onset   • Diabetes Brother    • Heart disease Mother    • Breast cancer Mother    • Kidney disease Other    • Breast cancer Other    • Diabetes Other    • Hypertension Other           Objective    Physical Exam  12 mm basal cell carcinoma, recurrent at the right  ala nasi sulcus.    Current Outpatient Prescriptions on File Prior to Visit   Medication Sig Dispense Refill   • acetaminophen (TYLENOL) 325 MG tablet Take 650 mg by mouth every 6 (six) hours.     • amLODIPine (NORVASC) 10 MG tablet TAKE 1 TABLET NIGHTLY 90 tablet 1   • B Complex Vitamins (VITAMIN B COMPLEX) tablet Take  by mouth daily.     • Cranberry 1000 MG capsule Take  by mouth Daily.     • fluticasone (FLONASE) 50 MCG/ACT nasal spray 2 sprays into each nostril Daily. 1 each 5   • furosemide (LASIX) 40 MG tablet 1 (ONE) TABLET, ORAL, DAILY  5   • hyoscyamine (LEVBID) 0.375 MG 12 hr tablet TAKE 1 TABLET TWICE A  tablet 1   • lansoprazole (PREVACID) 30 MG capsule Take 1 capsule by mouth Daily.     • levETIRAcetam (KEPPRA) 250 MG tablet TAKE 1 TABLET TWICE DAILY. 180 tablet 0   • levothyroxine (SYNTHROID, LEVOTHROID) 25 MCG tablet Take 1 tablet by mouth Every Morning. 90 tablet 0   • mirtazapine (REMERON) 15 MG tablet TAKE 1 TABLET BY MOUTH EVERY DAY IN THE EVENING 30 tablet 2   • Multiple Vitamins-Minerals (VITEYES AREDS ADVANCED) capsule Take  by mouth daily.     • mupirocin (BACTROBAN) 2 % ointment APPLY SPARINGLY TO THE BIOPSY AREA 2 TIMES A DAY  3   • olopatadine (PATANASE) 0.6 % solution nasal solution 2 sprays into each nostril 2 (Two) Times a Day. 1 bottle 5   • Probiotic Product  (PROBIOTIC DAILY PO) Take  by mouth.     • simvastatin (ZOCOR) 20 MG tablet TAKE 1 TABLET EVERY NIGHT 90 tablet 1     No current facility-administered medications on file prior to visit.        ALLERGIES:    Allergies   Allergen Reactions   • Sulfa Antibiotics Rash   • Azithromycin Diarrhea   • Risedronate Sodium      Other reaction(s): Other (See Comments)  ACHES   • Ciprofloxacin Hcl GI Intolerance   • Hydrocodone-Acetaminophen    • Risedronate        /75  Pulse 85  Temp 97 °F (36.1 °C) (Oral)   Resp 16  Wt 146 lb (66.2 kg)  SpO2 95%  BMI 24.3 kg/m2     No flowsheet data found.      Assessment/Plan   12 mm basal cell carcinoma, recurrent at the right  ala nasi sulcus.  We started the treatment planning process this morning with mask construction and CT simulation. We will have a dot decimal bolus constructed followed by CT planning.  My dose aim would be approximately 66 Gy in 37 fractions.  We would likely start her course of therapy next 7-10 days.  She and her daughter had many good questions which I believe were answered to their satisfaction.we went over the likely side effects to include skin erythema,nasal congestion, nasal soreness, possible bleeding, with resolution of all side effects at approximately the 8 week gianluca, post tx.               I spent greater than 45 minutes and face-to-face time with the patient and her daughter, and greater than 40 minutes of that time was spent in counseling and coordination of care including discussion of the indications, goals, logistics, alternatives, risks both common and rare, as well as surveillance potential outcomes.        Lavell Merlos MD

## 2017-11-15 DIAGNOSIS — Z86.79: Primary | ICD-10-CM

## 2017-11-15 RX ORDER — LEVETIRACETAM 250 MG/1
TABLET ORAL
Qty: 180 TABLET | Refills: 0 | Status: SHIPPED | OUTPATIENT
Start: 2017-11-15 | End: 2018-01-05

## 2017-11-22 PROCEDURE — 77295 3-D RADIOTHERAPY PLAN: CPT | Performed by: RADIOLOGY

## 2017-11-30 ENCOUNTER — OFFICE VISIT (OUTPATIENT)
Dept: INTERNAL MEDICINE | Age: 82
End: 2017-11-30

## 2017-11-30 VITALS
DIASTOLIC BLOOD PRESSURE: 66 MMHG | OXYGEN SATURATION: 98 % | HEIGHT: 65 IN | TEMPERATURE: 97.9 F | HEART RATE: 70 BPM | SYSTOLIC BLOOD PRESSURE: 144 MMHG | WEIGHT: 146 LBS | BODY MASS INDEX: 24.32 KG/M2

## 2017-11-30 DIAGNOSIS — F32.A DEPRESSION, UNSPECIFIED DEPRESSION TYPE: Chronic | ICD-10-CM

## 2017-11-30 DIAGNOSIS — K21.9 GASTROESOPHAGEAL REFLUX DISEASE, ESOPHAGITIS PRESENCE NOT SPECIFIED: Chronic | ICD-10-CM

## 2017-11-30 DIAGNOSIS — E78.2 MIXED HYPERLIPIDEMIA: Primary | Chronic | ICD-10-CM

## 2017-11-30 DIAGNOSIS — I10 ESSENTIAL HYPERTENSION: Chronic | ICD-10-CM

## 2017-11-30 DIAGNOSIS — E03.9 HYPOTHYROIDISM, UNSPECIFIED TYPE: Chronic | ICD-10-CM

## 2017-11-30 LAB
CHOLEST SERPL-MCNC: 153 MG/DL (ref 0–200)
CHOLEST/HDLC SERPL: 3 {RATIO}
HDLC SERPL-MCNC: 51 MG/DL (ref 40–60)
LDLC SERPL CALC-MCNC: 71 MG/DL (ref 0–100)
T4 FREE SERPL-MCNC: 1.21 NG/DL (ref 0.93–1.7)
TRIGL SERPL-MCNC: 156 MG/DL (ref 0–150)
TSH SERPL DL<=0.005 MIU/L-ACNC: 3.56 MIU/ML (ref 0.27–4.2)
VLDLC SERPL CALC-MCNC: 31.2 MG/DL (ref 5–40)

## 2017-11-30 PROCEDURE — 77331 SPECIAL RADIATION DOSIMETRY: CPT | Performed by: RADIOLOGY

## 2017-11-30 PROCEDURE — 77334 RADIATION TREATMENT AID(S): CPT | Performed by: RADIOLOGY

## 2017-11-30 PROCEDURE — 99214 OFFICE O/P EST MOD 30 MIN: CPT | Performed by: INTERNAL MEDICINE

## 2017-11-30 PROCEDURE — 77470 SPECIAL RADIATION TREATMENT: CPT | Performed by: RADIOLOGY

## 2017-11-30 NOTE — PROGRESS NOTES
"[facility] MEDICINE  HECTOR GUERRERO M.D.      Lianna B Fedde / 89 y.o. / female  11/30/2017      MEDICATIONS  Current Outpatient Prescriptions   Medication Sig Dispense Refill   • acetaminophen (TYLENOL) 325 MG tablet Take 650 mg by mouth every 6 (six) hours.     • amLODIPine (NORVASC) 10 MG tablet TAKE 1 TABLET NIGHTLY 90 tablet 1   • B Complex Vitamins (VITAMIN B COMPLEX) tablet Take  by mouth daily.     • Cranberry 1000 MG capsule Take  by mouth Daily.     • fluticasone (FLONASE) 50 MCG/ACT nasal spray 2 sprays into each nostril Daily. 1 each 5   • furosemide (LASIX) 40 MG tablet 1 (ONE) TABLET, ORAL, DAILY  5   • hyoscyamine (LEVBID) 0.375 MG 12 hr tablet TAKE 1 TABLET TWICE A  tablet 1   • lansoprazole (PREVACID) 30 MG capsule Take 1 capsule by mouth Daily.     • levETIRAcetam (KEPPRA) 250 MG tablet TAKE 1 TABLET TWICE DAILY. 180 tablet 0   • levothyroxine (SYNTHROID, LEVOTHROID) 25 MCG tablet Take 1 tablet by mouth Every Morning. 90 tablet 0   • mirtazapine (REMERON) 15 MG tablet TAKE 1 TABLET BY MOUTH EVERY DAY IN THE EVENING 30 tablet 2   • Multiple Vitamins-Minerals (VITEYES AREDS ADVANCED) capsule Take  by mouth daily.     • olopatadine (PATANASE) 0.6 % solution nasal solution 2 sprays into each nostril 2 (Two) Times a Day. 1 bottle 5   • Probiotic Product (PROBIOTIC DAILY PO) Take  by mouth.     • simvastatin (ZOCOR) 20 MG tablet TAKE 1 TABLET EVERY NIGHT 90 tablet 1   • mupirocin (BACTROBAN) 2 % ointment APPLY SPARINGLY TO THE BIOPSY AREA 2 TIMES A DAY  3     No current facility-administered medications for this visit.          VITALS    /66  Pulse 70  Temp 97.9 °F (36.6 °C)  Ht 65\" (165.1 cm)  Wt 146 lb (66.2 kg)  SpO2 98%  BMI 24.3 kg/m2  BP Readings from Last 3 Encounters:   11/30/17 144/66   11/07/17 158/75   09/12/17 158/60     Wt Readings from Last 3 Encounters:   11/30/17 146 lb (66.2 kg)   11/07/17 146 lb (66.2 kg)   09/12/17 146 lb (66.2 kg)      Body mass index is 24.3 " kg/(m^2).    CC:  Main reason(s) for today's visit: Follow-up for hypertension, cholesterol and other related medical problems    HPI:     Chronic essential hypertension:  Since prior visit: compliant with medication(s), does not check blood pressure at home and denies significant problems with medication(s).  Most recent in-office blood pressure readings:   BP Readings from Last 3 Encounters:   11/30/17 144/66   11/07/17 158/75   09/12/17 158/60       Chronic hyperlipidemia:  Current therapy include simvastatin, denies problems with medication.    Most recent labs:   Lab Results   Component Value Date    LDL 92 04/25/2016    HDL 58 04/25/2016    TRIG 130 04/25/2016    CHOLHDLRATIO 3.0 04/25/2016       CKD followed by Dr. Layton. Denies recent change in renal function.    Patient Care Team:  Mike Ricketts MD as PCP - General  Ester Villegas DPM as PCP - Claims Attributed  Brice Zambrano MD as Consulting Physician (Dermatology)  Ester Villegas DPM as Consulting Physician (Podiatry)  Chad Marino MD as Consulting Physician (Ophthalmology)  Jl Layton MD as Consulting Physician (Nephrology)  ____________________________________________________________________    ASSESSMENT & PLAN:    Problem List Items Addressed This Visit     Hypertension (Chronic)    Current Assessment & Plan     Running higher here. Monitor home BP and send results in 1 week. Continue amlodipine 10 mg for now.          Relevant Medications    furosemide (LASIX) 40 MG tablet    amLODIPine (NORVASC) 10 MG tablet    Hyperlipidemia - Primary (Chronic)    Overview     Stable. Continue simvastatin 20 mg daily.          Relevant Medications    simvastatin (ZOCOR) 20 MG tablet    Other Relevant Orders    Lipid Panel With / Chol / HDL Ratio    Hypothyroidism (Chronic)    Overview     Stable. Continue current dose of levothyroxine.          Relevant Medications    levothyroxine (SYNTHROID, LEVOTHROID) 25 MCG tablet    Other  Relevant Orders    TSH+Free T4    Depression (Chronic)    Overview     Stable. Continue mirtazapine.          Relevant Medications    mirtazapine (REMERON) 15 MG tablet    Gastroesophageal reflux disease (Chronic)    Current Assessment & Plan     May stop lansoprazole (kidney concerns) and take Zantac 75/150 bid.          Relevant Medications    lansoprazole (PREVACID) 30 MG capsule    hyoscyamine (LEVBID) 0.375 MG 12 hr tablet        Orders Placed This Encounter   Procedures   • Lipid Panel With / Chol / HDL Ratio   • TSH+Free T4       Summary/Discussion:     ·     Return in about 4 months (around 3/30/2018) for ONLY Hypertension, Hypertension.    Future Appointments  Date Time Provider Department Center   4/2/2018 10:40 AM Mike Ricketts MD [facility] None       ____________________________________________________________________      REVIEW OF SYSTEMS    Review of Systems  As noted per HPI  Constitutional neg  Resp neg  CV neg    denies change in urination  Neuro neg for ha's      PHYSICAL EXAMINATION    Physical Exam  Constitutional  No distress  Cardiovascular Rate  normal . Rhythm: regular . Heart sounds:  normal  Pulmonary/Chest  Effort normal. Breath sounds:  normal  Psychiatric  Alert. Judgment and thought content normal. Mood normal    REVIEWED DATA:    Labs:   Lab Results   Component Value Date     05/10/2016    K 5.2 05/10/2016    AST 24 04/25/2016    ALT 12 04/25/2016    BUN 43 (H) 05/10/2016    CREATININE 1.94 (H) 05/10/2016    CREATININE 1.86 (H) 04/25/2016    CREATININE 1.85 (H) 05/05/2015    EGFRIFNONA 22 (L) 05/10/2016    EGFRIFAFRI 26 (L) 05/10/2016       Lab Results   Component Value Date    GLU 74 05/10/2016    GLU 87 04/25/2016    GLU 95 05/05/2015       Lab Results   Component Value Date    LDL 92 04/25/2016    HDL 58 04/25/2016    TRIG 130 04/25/2016    CHOLHDLRATIO 3.0 04/25/2016       Lab Results   Component Value Date    TSH 3.020 04/25/2016    FREET4 1.22 04/25/2016          Lab  Results   Component Value Date    WBC 11-30 (A) 03/10/2016    HGB 12.0 05/05/2015     05/05/2015        Imaging:        Medical Tests:        Summary of old records / correspondence / consultant report:        Request outside records:        ALLERGIES  Allergies   Allergen Reactions   • Sulfa Antibiotics Rash   • Azithromycin Diarrhea   • Risedronate Sodium      Other reaction(s): Other (See Comments)  ACHES   • Ciprofloxacin Hcl GI Intolerance   • Hydrocodone-Acetaminophen    • Risedronate         PFSH:     The following portions of the patient's history were reviewed and updated as appropriate: Allergies / Current Medications / Past Medical History / Surgical History / Social History / Family History    PROBLEM LIST   Patient Active Problem List   Diagnosis   • Atopic rhinitis   • Anemia   • Stenosis of carotid artery   • Chronic kidney disease   • Depression   • Gastroesophageal reflux disease   • Hypertension   • Hyperlipidemia   • Hypothyroidism   • Irritable bowel syndrome with diarrhea   • Restless legs syndrome   • Vitamin D deficiency   • Basal cell carcinoma of ala nasi       PAST MEDICAL HISTORY  Past Medical History:   Diagnosis Date   • Anemia    • Arthritis    • Basal cell carcinoma of ala nasi 11/7/2017   • Cataract    • Cerebral hemorrhage 8/15/2014   • Closed fracture of proximal end of humerus 5/15/2015   • Diverticulosis of intestine 3/10/2016   • GERD (gastroesophageal reflux disease)    • Hyperlipidemia    • Irritable bowel syndrome    • Low back pain    • Osteopenia    • Renal insufficiency    • Seizures    • Stroke    • Tremor    • Visual impairment     magular degeneration       SURGICAL HISTORY  Past Surgical History:   Procedure Laterality Date   • CHOLECYSTECTOMY     • COLONOSCOPY     • EYE SURGERY      cataracts removed   • HYSTERECTOMY     • TONSILLECTOMY         SOCIAL HISTORY  Social History     Social History   • Marital status:      Spouse name: N/A   • Number of  children: 3   • Years of education: N/A     Occupational History   • Retired      [employer] Teacher     Social History Main Topics   • Smoking status: Never Smoker   • Smokeless tobacco: Never Used   • Alcohol use No   • Drug use: No   • Sexual activity: No     Other Topics Concern   • None     Social History Narrative    Lives in [place] apartment [place] the [place].       FAMILY HISTORY  Family History   Problem Relation Age of Onset   • Diabetes Brother    • Heart disease Mother    • Breast cancer Mother    • Kidney disease Other    • Breast cancer Other    • Diabetes Other    • Hypertension Other          **Dragon Disclaimer:   Much of this encounter note is an electronic transcription/translation of spoken language to printed text. The electronic translation of spoken language may permit erroneous, or at times, nonsensical words or phrases to be inadvertently transcribed. Although I have reviewed the note for such errors, some may still exist.

## 2017-11-30 NOTE — ASSESSMENT & PLAN NOTE
Running higher here. Monitor home BP and send results in 1 week. Continue amlodipine 10 mg for now.

## 2017-12-01 ENCOUNTER — APPOINTMENT (OUTPATIENT)
Dept: RADIATION ONCOLOGY | Facility: HOSPITAL | Age: 82
End: 2017-12-01

## 2017-12-01 PROCEDURE — 77370 RADIATION PHYSICS CONSULT: CPT | Performed by: RADIOLOGY

## 2017-12-01 PROCEDURE — 77334 RADIATION TREATMENT AID(S): CPT | Performed by: RADIOLOGY

## 2017-12-01 PROCEDURE — 77280 THER RAD SIMULAJ FIELD SMPL: CPT | Performed by: RADIOLOGY

## 2017-12-01 PROCEDURE — 77300 RADIATION THERAPY DOSE PLAN: CPT | Performed by: RADIOLOGY

## 2017-12-01 PROCEDURE — 77412 RADIATION TX DELIVERY LVL 3: CPT | Performed by: RADIOLOGY

## 2017-12-01 PROCEDURE — 77427 RADIATION TX MANAGEMENT X5: CPT | Performed by: RADIOLOGY

## 2017-12-04 PROCEDURE — 77336 RADIATION PHYSICS CONSULT: CPT | Performed by: RADIOLOGY

## 2017-12-04 PROCEDURE — 77412 RADIATION TX DELIVERY LVL 3: CPT | Performed by: RADIOLOGY

## 2017-12-05 ENCOUNTER — RADIATION ONCOLOGY WEEKLY ASSESSMENT (OUTPATIENT)
Dept: RADIATION ONCOLOGY | Facility: HOSPITAL | Age: 82
End: 2017-12-05

## 2017-12-05 VITALS
DIASTOLIC BLOOD PRESSURE: 73 MMHG | HEIGHT: 65 IN | TEMPERATURE: 97.7 F | HEART RATE: 88 BPM | OXYGEN SATURATION: 96 % | BODY MASS INDEX: 24.99 KG/M2 | WEIGHT: 150 LBS | RESPIRATION RATE: 18 BRPM | SYSTOLIC BLOOD PRESSURE: 152 MMHG

## 2017-12-05 DIAGNOSIS — C44.311 BASAL CELL CARCINOMA OF ALA NASI: Primary | ICD-10-CM

## 2017-12-05 PROCEDURE — 77412 RADIATION TX DELIVERY LVL 3: CPT | Performed by: RADIOLOGY

## 2017-12-05 NOTE — PROGRESS NOTES
Physician Weekly Management Note    Diagnosis:     Diagnosis Plan   1. Basal cell carcinoma of ala nasi         RT Details:    Treatment #3/37    Notes on Treatment course, Films, Medical progress:    No questions or complaints, continue as planned.    Weekly Management:  Medication reviewed?   Yes  New medications given?   No  Problemlist reviewed?   Yes  Problem added?   No      Technical aspects reviewed:  Weekly OBI approved?   Yes  Weekly port films approved?   Yes  Change requests noted on port film?   No  Patient setup and plan reviewed?   Yes    Chart Reviewed:  Continue current treatment plan?   Yes  Treatment plan change requested?   No  CBC reviewed?   No  Concurrent Chemo?   No    Objective     Toxicities:   None     Review of Systems   As above    Vitals:    12/05/17 1153   BP: 152/73   Pulse: 88   Resp: 18   Temp: 97.7 °F (36.5 °C)   SpO2: 96%       No flowsheet data found.    Physical Exam  As above      Problem Summary List    Diagnosis:     Diagnosis Plan   1. Basal cell carcinoma of ala nasi       Pathology:       Past Medical History:   Diagnosis Date   • Anemia    • Arthritis    • Basal cell carcinoma of ala nasi 11/7/2017   • Cataract    • Cerebral hemorrhage 8/15/2014   • Closed fracture of proximal end of humerus 5/15/2015   • Diverticulosis of intestine 3/10/2016   • GERD (gastroesophageal reflux disease)    • Hyperlipidemia    • Irritable bowel syndrome    • Low back pain    • Osteopenia    • Renal insufficiency    • Seizures    • Stroke    • Tremor    • Visual impairment     magular degeneration         Past Surgical History:   Procedure Laterality Date   • CHOLECYSTECTOMY     • COLONOSCOPY     • EYE SURGERY      cataracts removed   • HYSTERECTOMY     • TONSILLECTOMY           Current Outpatient Prescriptions on File Prior to Visit   Medication Sig Dispense Refill   • acetaminophen (TYLENOL) 325 MG tablet Take 650 mg by mouth every 6 (six) hours.     • amLODIPine (NORVASC) 10 MG tablet TAKE  1 TABLET NIGHTLY 90 tablet 1   • B Complex Vitamins (VITAMIN B COMPLEX) tablet Take  by mouth daily.     • Cranberry 1000 MG capsule Take  by mouth Daily.     • fluticasone (FLONASE) 50 MCG/ACT nasal spray 2 sprays into each nostril Daily. 1 each 5   • furosemide (LASIX) 40 MG tablet 1 (ONE) TABLET, ORAL, DAILY  5   • hyoscyamine (LEVBID) 0.375 MG 12 hr tablet TAKE 1 TABLET TWICE A  tablet 1   • lansoprazole (PREVACID) 30 MG capsule Take 1 capsule by mouth Daily.     • levETIRAcetam (KEPPRA) 250 MG tablet TAKE 1 TABLET TWICE DAILY. 180 tablet 0   • levothyroxine (SYNTHROID, LEVOTHROID) 25 MCG tablet Take 1 tablet by mouth Every Morning. 90 tablet 0   • mirtazapine (REMERON) 15 MG tablet TAKE 1 TABLET BY MOUTH EVERY DAY IN THE EVENING 30 tablet 2   • Multiple Vitamins-Minerals (VITEYES AREDS ADVANCED) capsule Take  by mouth daily.     • mupirocin (BACTROBAN) 2 % ointment APPLY SPARINGLY TO THE BIOPSY AREA 2 TIMES A DAY  3   • olopatadine (PATANASE) 0.6 % solution nasal solution 2 sprays into each nostril 2 (Two) Times a Day. 1 bottle 5   • Probiotic Product (PROBIOTIC DAILY PO) Take  by mouth.     • simvastatin (ZOCOR) 20 MG tablet TAKE 1 TABLET EVERY NIGHT 90 tablet 1     No current facility-administered medications on file prior to visit.        Allergies   Allergen Reactions   • Sulfa Antibiotics Rash   • Azithromycin Diarrhea   • Risedronate Sodium      Other reaction(s): Other (See Comments)  ACHES   • Ciprofloxacin Hcl GI Intolerance   • Hydrocodone-Acetaminophen    • Risedronate         Primary care MD:    Mike Ricketts MD    Oncologist:    Seen and approved by:  Lavell Merlos MD  12/05/2017

## 2017-12-06 PROCEDURE — 77412 RADIATION TX DELIVERY LVL 3: CPT | Performed by: RADIOLOGY

## 2017-12-07 PROCEDURE — 77412 RADIATION TX DELIVERY LVL 3: CPT | Performed by: RADIOLOGY

## 2017-12-08 PROCEDURE — 77412 RADIATION TX DELIVERY LVL 3: CPT | Performed by: RADIOLOGY

## 2017-12-08 PROCEDURE — 77427 RADIATION TX MANAGEMENT X5: CPT | Performed by: RADIOLOGY

## 2017-12-11 ENCOUNTER — RADIATION ONCOLOGY WEEKLY ASSESSMENT (OUTPATIENT)
Dept: RADIATION ONCOLOGY | Facility: HOSPITAL | Age: 82
End: 2017-12-11

## 2017-12-11 VITALS
OXYGEN SATURATION: 96 % | HEART RATE: 81 BPM | SYSTOLIC BLOOD PRESSURE: 158 MMHG | DIASTOLIC BLOOD PRESSURE: 65 MMHG | RESPIRATION RATE: 16 BRPM

## 2017-12-11 DIAGNOSIS — C44.311 BASAL CELL CARCINOMA OF ALA NASI: Primary | ICD-10-CM

## 2017-12-11 PROCEDURE — 77336 RADIATION PHYSICS CONSULT: CPT | Performed by: RADIOLOGY

## 2017-12-11 PROCEDURE — 77412 RADIATION TX DELIVERY LVL 3: CPT | Performed by: RADIOLOGY

## 2017-12-11 NOTE — PROGRESS NOTES
Physician Weekly Management Note    Diagnosis:     Diagnosis Plan   1. Basal cell carcinoma of ala nasi         RT Details:   Treatment #7/37   Right nasal Ala    Notes on Treatment course, Films, Medical progress:  Demonstrated good tolerance no significant erythema no treatment-related bleeding or congestion or soreness at this time.  Continue as planned.    Weekly Management:  Medication reviewed?   Yes  New medications given?   No  Problemlist reviewed?   Yes  Problem added?   No      Technical aspects reviewed:  Weekly OBI approved?   Yes  Weekly port films approved?   Yes  Change requests noted on port film?   No  Patient setup and plan reviewed?   Yes    Chart Reviewed:  Continue current treatment plan?   Yes  Treatment plan change requested?   No  CBC reviewed?   No  Concurrent Chemo?   No    Objective     Toxicities:     None     Review of Systems   As above    Vitals:    12/11/17 1159   BP: 158/65   Pulse: 81   Resp: 16   SpO2: 96%       No flowsheet data found.    Physical Exam  As above      Problem Summary List    Diagnosis:     Diagnosis Plan   1. Basal cell carcinoma of ala nasi       Pathology:       Past Medical History:   Diagnosis Date   • Anemia    • Arthritis    • Basal cell carcinoma of ala nasi 11/7/2017   • Cataract    • Cerebral hemorrhage 8/15/2014   • Closed fracture of proximal end of humerus 5/15/2015   • Diverticulosis of intestine 3/10/2016   • GERD (gastroesophageal reflux disease)    • Hyperlipidemia    • Irritable bowel syndrome    • Low back pain    • Osteopenia    • Renal insufficiency    • Seizures    • Stroke    • Tremor    • Visual impairment     magular degeneration         Past Surgical History:   Procedure Laterality Date   • CHOLECYSTECTOMY     • COLONOSCOPY     • EYE SURGERY      cataracts removed   • HYSTERECTOMY     • TONSILLECTOMY           Current Outpatient Prescriptions on File Prior to Visit   Medication Sig Dispense Refill   • acetaminophen (TYLENOL) 325 MG tablet  Take 650 mg by mouth every 6 (six) hours.     • amLODIPine (NORVASC) 10 MG tablet TAKE 1 TABLET NIGHTLY 90 tablet 1   • B Complex Vitamins (VITAMIN B COMPLEX) tablet Take  by mouth daily.     • Cranberry 1000 MG capsule Take  by mouth Daily.     • fluticasone (FLONASE) 50 MCG/ACT nasal spray 2 sprays into each nostril Daily. 1 each 5   • furosemide (LASIX) 40 MG tablet 1 (ONE) TABLET, ORAL, DAILY  5   • hyoscyamine (LEVBID) 0.375 MG 12 hr tablet TAKE 1 TABLET TWICE A  tablet 1   • lansoprazole (PREVACID) 30 MG capsule Take 1 capsule by mouth Daily.     • levETIRAcetam (KEPPRA) 250 MG tablet TAKE 1 TABLET TWICE DAILY. 180 tablet 0   • levothyroxine (SYNTHROID, LEVOTHROID) 25 MCG tablet Take 1 tablet by mouth Every Morning. 90 tablet 0   • mirtazapine (REMERON) 15 MG tablet TAKE 1 TABLET BY MOUTH EVERY DAY IN THE EVENING 30 tablet 2   • Multiple Vitamins-Minerals (VITEYES AREDS ADVANCED) capsule Take  by mouth daily.     • mupirocin (BACTROBAN) 2 % ointment APPLY SPARINGLY TO THE BIOPSY AREA 2 TIMES A DAY  3   • olopatadine (PATANASE) 0.6 % solution nasal solution 2 sprays into each nostril 2 (Two) Times a Day. 1 bottle 5   • Probiotic Product (PROBIOTIC DAILY PO) Take  by mouth.     • simvastatin (ZOCOR) 20 MG tablet TAKE 1 TABLET EVERY NIGHT 90 tablet 1     No current facility-administered medications on file prior to visit.        Allergies   Allergen Reactions   • Sulfa Antibiotics Rash   • Azithromycin Diarrhea   • Risedronate Sodium      Other reaction(s): Other (See Comments)  ACHES   • Ciprofloxacin Hcl GI Intolerance   • Hydrocodone-Acetaminophen    • Risedronate         Primary care MD:    Mike Ricketts MD    Oncologist:     Seen and approved by:  Lavell Merlos MD  12/11/2017

## 2017-12-12 PROCEDURE — 77412 RADIATION TX DELIVERY LVL 3: CPT | Performed by: RADIOLOGY

## 2017-12-13 DIAGNOSIS — F32.A DEPRESSION, UNSPECIFIED DEPRESSION TYPE: ICD-10-CM

## 2017-12-13 PROCEDURE — 77412 RADIATION TX DELIVERY LVL 3: CPT | Performed by: RADIOLOGY

## 2017-12-13 RX ORDER — MIRTAZAPINE 15 MG/1
TABLET, FILM COATED ORAL
Qty: 30 TABLET | Refills: 2 | Status: SHIPPED | OUTPATIENT
Start: 2017-12-13 | End: 2018-01-05

## 2017-12-14 ENCOUNTER — DOCUMENTATION (OUTPATIENT)
Dept: RADIATION ONCOLOGY | Facility: HOSPITAL | Age: 82
End: 2017-12-14

## 2017-12-14 PROCEDURE — 77412 RADIATION TX DELIVERY LVL 3: CPT | Performed by: RADIOLOGY

## 2017-12-14 NOTE — PROGRESS NOTES
"Informed by Therapists Ms Hawthorne would like to speak to me . Ms Hawthorne c/o swelling to her right upper lip and difficulty eating because \"it feels funny with lip being swollen\" Recommended she try cold compress to lip this afternoon to see if it helps and I will follow up with her in the am  "

## 2017-12-15 PROCEDURE — 77427 RADIATION TX MANAGEMENT X5: CPT | Performed by: RADIOLOGY

## 2017-12-15 PROCEDURE — 77412 RADIATION TX DELIVERY LVL 3: CPT | Performed by: RADIOLOGY

## 2017-12-18 PROCEDURE — 77336 RADIATION PHYSICS CONSULT: CPT | Performed by: RADIOLOGY

## 2017-12-18 PROCEDURE — 77412 RADIATION TX DELIVERY LVL 3: CPT | Performed by: RADIOLOGY

## 2017-12-19 ENCOUNTER — RADIATION ONCOLOGY WEEKLY ASSESSMENT (OUTPATIENT)
Dept: RADIATION ONCOLOGY | Facility: HOSPITAL | Age: 82
End: 2017-12-19

## 2017-12-19 VITALS
RESPIRATION RATE: 16 BRPM | HEART RATE: 93 BPM | TEMPERATURE: 97.3 F | OXYGEN SATURATION: 98 % | DIASTOLIC BLOOD PRESSURE: 86 MMHG | SYSTOLIC BLOOD PRESSURE: 142 MMHG

## 2017-12-19 DIAGNOSIS — C44.311 BASAL CELL CARCINOMA OF SKIN OF NOSE: Primary | ICD-10-CM

## 2017-12-19 PROCEDURE — 77412 RADIATION TX DELIVERY LVL 3: CPT | Performed by: RADIOLOGY

## 2017-12-19 NOTE — PROGRESS NOTES
Physician Weekly Management Note    Diagnosis:     Diagnosis Plan   1. Basal cell carcinoma of skin of nose         RT Details:    Treatment #13/27  -  Right nose    Notes on Treatment course, Films, Medical progress:    Developing expected skin erythema and some occasional bleeding.  No pain.  Tight scab over primary site.  Upper lip mucosa within normal limits.  Continue as planned.    Weekly Management:  Medication reviewed?   Yes  New medications given?   No  Problemlist reviewed?   Yes  Problem added?   No      Technical aspects reviewed:  Weekly OBI approved?   Yes  Weekly port films approved?   Yes  Change requests noted on port film?   No  Patient setup and plan reviewed?   Yes    Chart Reviewed:  Continue current treatment plan?   Yes  Treatment plan change requested?   No  CBC reviewed?   No  Concurrent Chemo?   No    Objective     Toxicities:     As above     Review of Systems         Vitals:    12/19/17 1139   BP: 142/86   Pulse: 93   Resp: 16   Temp: 97.3 °F (36.3 °C)   SpO2: 98%       No flowsheet data found.    Physical Exam  As above      Problem Summary List    Diagnosis:     Diagnosis Plan   1. Basal cell carcinoma of skin of nose       Pathology:   As above    Past Medical History:   Diagnosis Date   • Anemia    • Arthritis    • Basal cell carcinoma of ala nasi 11/7/2017   • Cataract    • Cerebral hemorrhage 8/15/2014   • Closed fracture of proximal end of humerus 5/15/2015   • Diverticulosis of intestine 3/10/2016   • GERD (gastroesophageal reflux disease)    • Hyperlipidemia    • Irritable bowel syndrome    • Low back pain    • Osteopenia    • Renal insufficiency    • Seizures    • Stroke    • Tremor    • Visual impairment     magular degeneration         Past Surgical History:   Procedure Laterality Date   • CHOLECYSTECTOMY     • COLONOSCOPY     • EYE SURGERY      cataracts removed   • HYSTERECTOMY     • TONSILLECTOMY           Current Outpatient Prescriptions on File Prior to Visit    Medication Sig Dispense Refill   • acetaminophen (TYLENOL) 325 MG tablet Take 650 mg by mouth every 6 (six) hours.     • amLODIPine (NORVASC) 10 MG tablet TAKE 1 TABLET NIGHTLY 90 tablet 1   • B Complex Vitamins (VITAMIN B COMPLEX) tablet Take  by mouth daily.     • Cranberry 1000 MG capsule Take  by mouth Daily.     • fluticasone (FLONASE) 50 MCG/ACT nasal spray 2 sprays into each nostril Daily. 1 each 5   • furosemide (LASIX) 40 MG tablet 1 (ONE) TABLET, ORAL, DAILY  5   • hyoscyamine (LEVBID) 0.375 MG 12 hr tablet TAKE 1 TABLET TWICE A  tablet 1   • lansoprazole (PREVACID) 30 MG capsule Take 1 capsule by mouth Daily.     • levETIRAcetam (KEPPRA) 250 MG tablet TAKE 1 TABLET TWICE DAILY. 180 tablet 0   • levothyroxine (SYNTHROID, LEVOTHROID) 25 MCG tablet Take 1 tablet by mouth Every Morning. 90 tablet 0   • mirtazapine (REMERON) 15 MG tablet TAKE 1 TABLET BY MOUTH EVERY DAY IN THE EVENING 30 tablet 2   • Multiple Vitamins-Minerals (VITEYES AREDS ADVANCED) capsule Take  by mouth daily.     • mupirocin (BACTROBAN) 2 % ointment APPLY SPARINGLY TO THE BIOPSY AREA 2 TIMES A DAY  3   • olopatadine (PATANASE) 0.6 % solution nasal solution 2 sprays into each nostril 2 (Two) Times a Day. 1 bottle 5   • Probiotic Product (PROBIOTIC DAILY PO) Take  by mouth.     • simvastatin (ZOCOR) 20 MG tablet TAKE 1 TABLET EVERY NIGHT 90 tablet 1     No current facility-administered medications on file prior to visit.        Allergies   Allergen Reactions   • Sulfa Antibiotics Rash   • Azithromycin Diarrhea   • Risedronate Sodium      Other reaction(s): Other (See Comments)  ACHES   • Ciprofloxacin Hcl GI Intolerance   • Hydrocodone-Acetaminophen    • Risedronate        Primary care MD:    Mike Ricketts MD    Oncologist:     Seen and approved by:  Lavell Merlos MD  12/19/2017

## 2017-12-20 PROCEDURE — 77412 RADIATION TX DELIVERY LVL 3: CPT | Performed by: RADIOLOGY

## 2017-12-22 PROCEDURE — 77412 RADIATION TX DELIVERY LVL 3: CPT | Performed by: RADIOLOGY

## 2017-12-26 ENCOUNTER — RADIATION ONCOLOGY WEEKLY ASSESSMENT (OUTPATIENT)
Dept: RADIATION ONCOLOGY | Facility: HOSPITAL | Age: 82
End: 2017-12-26

## 2017-12-26 VITALS
RESPIRATION RATE: 16 BRPM | OXYGEN SATURATION: 98 % | HEART RATE: 91 BPM | TEMPERATURE: 97.2 F | DIASTOLIC BLOOD PRESSURE: 66 MMHG | SYSTOLIC BLOOD PRESSURE: 141 MMHG

## 2017-12-26 DIAGNOSIS — C44.311 BASAL CELL CARCINOMA OF SKIN OF NOSE: Primary | ICD-10-CM

## 2017-12-26 PROCEDURE — FACE2FACE: Performed by: RADIOLOGY

## 2017-12-26 NOTE — PROGRESS NOTES
Physician Weekly Management Note    Diagnosis:     Diagnosis Plan   1. Basal cell carcinoma of skin of nose         RT Details:   Treatment #16/37    Notes on Treatment course, Films, Medical progress:  Patient has developed significant erythema and drainage of her right eye.  She has also developed increasing erythema in the treatment portal over the nose.  She's having some tu congestion, which is expected.  I put in a call to Dr. Chad Marino her ophthalmologist and he will see her this afternoon at 1:30, to evaluate her follow for some topical support right eye.  We will hold her radiation therapy for the next week.    Weekly Management:  Medication reviewed?   Yes  New medications given?   No  Problemlist reviewed?   Yes  Problem added?   No      Technical aspects reviewed:  Weekly OBI approved?   Yes  Weekly port films approved?   Yes  Change requests noted on port film?   No  Patient setup and plan reviewed?   Yes    Chart Reviewed:  Continue current treatment plan?   Yes  Treatment plan change requested?   No  CBC reviewed?   No  Concurrent Chemo?   No    Objective     Toxicities:   As above     Review of Systems   As above    Vitals:    12/26/17 1040   BP: 141/66   Pulse: 91   Resp: 16   Temp: 97.2 °F (36.2 °C)   SpO2: 98%       No flowsheet data found.    Physical Exam  As above      Problem Summary List    Diagnosis:     Diagnosis Plan   1. Basal cell carcinoma of skin of nose       Pathology:       Past Medical History:   Diagnosis Date   • Anemia    • Arthritis    • Basal cell carcinoma of ala nasi 11/7/2017   • Cataract    • Cerebral hemorrhage 8/15/2014   • Closed fracture of proximal end of humerus 5/15/2015   • Diverticulosis of intestine 3/10/2016   • GERD (gastroesophageal reflux disease)    • Hyperlipidemia    • Irritable bowel syndrome    • Low back pain    • Osteopenia    • Renal insufficiency    • Seizures    • Stroke    • Tremor    • Visual impairment     magular degeneration         Past  Surgical History:   Procedure Laterality Date   • CHOLECYSTECTOMY     • COLONOSCOPY     • EYE SURGERY      cataracts removed   • HYSTERECTOMY     • TONSILLECTOMY           Current Outpatient Prescriptions on File Prior to Visit   Medication Sig Dispense Refill   • acetaminophen (TYLENOL) 325 MG tablet Take 650 mg by mouth every 6 (six) hours.     • amLODIPine (NORVASC) 10 MG tablet TAKE 1 TABLET NIGHTLY 90 tablet 1   • B Complex Vitamins (VITAMIN B COMPLEX) tablet Take  by mouth daily.     • Cranberry 1000 MG capsule Take  by mouth Daily.     • fluticasone (FLONASE) 50 MCG/ACT nasal spray 2 sprays into each nostril Daily. 1 each 5   • furosemide (LASIX) 40 MG tablet 1 (ONE) TABLET, ORAL, DAILY  5   • hyoscyamine (LEVBID) 0.375 MG 12 hr tablet TAKE 1 TABLET TWICE A  tablet 1   • lansoprazole (PREVACID) 30 MG capsule Take 1 capsule by mouth Daily.     • levETIRAcetam (KEPPRA) 250 MG tablet TAKE 1 TABLET TWICE DAILY. 180 tablet 0   • levothyroxine (SYNTHROID, LEVOTHROID) 25 MCG tablet Take 1 tablet by mouth Every Morning. 90 tablet 0   • mirtazapine (REMERON) 15 MG tablet TAKE 1 TABLET BY MOUTH EVERY DAY IN THE EVENING 30 tablet 2   • Multiple Vitamins-Minerals (VITEYES AREDS ADVANCED) capsule Take  by mouth daily.     • mupirocin (BACTROBAN) 2 % ointment APPLY SPARINGLY TO THE BIOPSY AREA 2 TIMES A DAY  3   • olopatadine (PATANASE) 0.6 % solution nasal solution 2 sprays into each nostril 2 (Two) Times a Day. 1 bottle 5   • Probiotic Product (PROBIOTIC DAILY PO) Take  by mouth.     • simvastatin (ZOCOR) 20 MG tablet TAKE 1 TABLET EVERY NIGHT 90 tablet 1     No current facility-administered medications on file prior to visit.        Allergies   Allergen Reactions   • Sulfa Antibiotics Rash   • Azithromycin Diarrhea   • Risedronate Sodium      Other reaction(s): Other (See Comments)  ACHES   • Ciprofloxacin Hcl GI Intolerance   • Hydrocodone-Acetaminophen    • Risedronate         Primary care MD:    Mike Ricketts,  MD    Oncologist:     Seen and approved by:  Lavell Merlos MD  12/26/2017

## 2017-12-28 ENCOUNTER — TELEPHONE (OUTPATIENT)
Dept: RADIATION ONCOLOGY | Facility: HOSPITAL | Age: 82
End: 2017-12-28

## 2017-12-28 NOTE — TELEPHONE ENCOUNTER
Received call from patient daughter that Ms Dsouza is having dryness of nostril not relieved with the saline spray. They tried glycerine and it burned so stopped. I advised using aquaphor and Katiuska voiced understanding. Ms Hawthorne is off on break due to reaction until we notify them to resume.

## 2018-01-01 DIAGNOSIS — E78.5 HYPERLIPIDEMIA, UNSPECIFIED HYPERLIPIDEMIA TYPE: ICD-10-CM

## 2018-01-01 DIAGNOSIS — J30.9 ATOPIC RHINITIS: Chronic | ICD-10-CM

## 2018-01-01 DIAGNOSIS — K58.0 IRRITABLE BOWEL SYNDROME WITH DIARRHEA: ICD-10-CM

## 2018-01-01 RX ORDER — SIMVASTATIN 20 MG
TABLET ORAL
Qty: 90 TABLET | Refills: 3 | Status: SHIPPED | OUTPATIENT
Start: 2018-01-01

## 2018-01-01 RX ORDER — HYOSCYAMINE SULFATE EXTENDED-RELEASE 0.38 MG/1
TABLET ORAL
Qty: 180 TABLET | Refills: 1 | Status: SHIPPED | OUTPATIENT
Start: 2018-01-01 | End: 2019-01-01 | Stop reason: SDUPTHER

## 2018-01-01 RX ORDER — FLUTICASONE PROPIONATE 50 MCG
SPRAY, SUSPENSION (ML) NASAL
Qty: 16 ML | Refills: 5 | Status: SHIPPED | OUTPATIENT
Start: 2018-01-01

## 2018-01-02 ENCOUNTER — APPOINTMENT (OUTPATIENT)
Dept: RADIATION ONCOLOGY | Facility: HOSPITAL | Age: 83
End: 2018-01-02

## 2018-01-05 ENCOUNTER — APPOINTMENT (OUTPATIENT)
Dept: PREADMISSION TESTING | Facility: HOSPITAL | Age: 83
End: 2018-01-05

## 2018-01-05 VITALS
DIASTOLIC BLOOD PRESSURE: 77 MMHG | WEIGHT: 144 LBS | TEMPERATURE: 98.3 F | RESPIRATION RATE: 16 BRPM | BODY MASS INDEX: 23.99 KG/M2 | OXYGEN SATURATION: 97 % | HEIGHT: 65 IN | SYSTOLIC BLOOD PRESSURE: 149 MMHG | HEART RATE: 75 BPM

## 2018-01-05 LAB
ANION GAP SERPL CALCULATED.3IONS-SCNC: 14.8 MMOL/L
BUN BLD-MCNC: 60 MG/DL (ref 8–23)
BUN/CREAT SERPL: 24.8 (ref 7–25)
CALCIUM SPEC-SCNC: 9.5 MG/DL (ref 8.2–9.6)
CHLORIDE SERPL-SCNC: 109 MMOL/L (ref 98–107)
CO2 SERPL-SCNC: 23.2 MMOL/L (ref 22–29)
CREAT BLD-MCNC: 2.42 MG/DL (ref 0.57–1)
DEPRECATED RDW RBC AUTO: 48.1 FL (ref 37–54)
ERYTHROCYTE [DISTWIDTH] IN BLOOD BY AUTOMATED COUNT: 13.3 % (ref 11.7–13)
GFR SERPL CREATININE-BSD FRML MDRD: 19 ML/MIN/1.73
GLUCOSE BLD-MCNC: 89 MG/DL (ref 65–99)
HCT VFR BLD AUTO: 36.8 % (ref 35.6–45.5)
HGB BLD-MCNC: 11.4 G/DL (ref 11.9–15.5)
MCH RBC QN AUTO: 30.6 PG (ref 26.9–32)
MCHC RBC AUTO-ENTMCNC: 31 G/DL (ref 32.4–36.3)
MCV RBC AUTO: 98.9 FL (ref 80.5–98.2)
PLATELET # BLD AUTO: 221 10*3/MM3 (ref 140–500)
PMV BLD AUTO: 10.8 FL (ref 6–12)
POTASSIUM BLD-SCNC: 5 MMOL/L (ref 3.5–5.2)
RBC # BLD AUTO: 3.72 10*6/MM3 (ref 3.9–5.2)
SODIUM BLD-SCNC: 147 MMOL/L (ref 136–145)
WBC NRBC COR # BLD: 7.29 10*3/MM3 (ref 4.5–10.7)

## 2018-01-05 PROCEDURE — 93010 ELECTROCARDIOGRAM REPORT: CPT | Performed by: INTERNAL MEDICINE

## 2018-01-05 PROCEDURE — 93005 ELECTROCARDIOGRAM TRACING: CPT

## 2018-01-05 PROCEDURE — 85027 COMPLETE CBC AUTOMATED: CPT | Performed by: OPHTHALMOLOGY

## 2018-01-05 PROCEDURE — 80048 BASIC METABOLIC PNL TOTAL CA: CPT | Performed by: OPHTHALMOLOGY

## 2018-01-05 PROCEDURE — 36415 COLL VENOUS BLD VENIPUNCTURE: CPT

## 2018-01-05 RX ORDER — HYOSCYAMINE SULFATE EXTENDED-RELEASE 0.38 MG/1
0.38 TABLET ORAL EVERY 12 HOURS PRN
COMMUNITY
End: 2019-01-01 | Stop reason: SDUPTHER

## 2018-01-05 RX ORDER — MIRTAZAPINE 15 MG/1
15 TABLET, FILM COATED ORAL NIGHTLY
COMMUNITY
End: 2018-04-25 | Stop reason: SDUPTHER

## 2018-01-05 RX ORDER — LEVETIRACETAM 250 MG/1
250 TABLET ORAL 2 TIMES DAILY
COMMUNITY
End: 2018-04-25

## 2018-01-05 RX ORDER — AMLODIPINE BESYLATE 10 MG/1
10 TABLET ORAL EVERY EVENING
COMMUNITY
End: 2018-04-25 | Stop reason: SDUPTHER

## 2018-01-05 RX ORDER — SIMVASTATIN 20 MG
20 TABLET ORAL NIGHTLY
COMMUNITY
End: 2019-01-01 | Stop reason: SDUPTHER

## 2018-01-05 RX ORDER — FUROSEMIDE 40 MG/1
40 TABLET ORAL EVERY MORNING
COMMUNITY

## 2018-01-05 NOTE — DISCHARGE INSTRUCTIONS
SURGERY 1-9-18  ARRIVAL TIME 5:45   APPT TIME CHANGE 1-2 DAYS AGO  DAUGHTER REPORTS    Take the following medications the morning of surgery with a small sip of water:    KEPPRA      General Instructions:  • Do not eat solid food after midnight the night before surgery.  • You may drink clear liquids day of surgery but must stop at least one hour before your hospital arrival time.  • It is beneficial for you to have a clear drink that contains carbohydrates the day of surgery.  We suggest a 12 to 20 ounce bottle of Gatorade or Powerade for non-diabetic patients or a 12 to 20 ounce bottle of G2 or Powerade Zero for diabetic patients. (Pediatric patients, are not advised to drink a 12 to 20 ounce carbohydrate drink)    Clear liquids are liquids you can see through.  Nothing red in color.     Plain water                               Sports drinks  Sodas                                   Gelatin (Jell-O)  Fruit juices without pulp such as white grape juice and apple juice  Popsicles that contain no fruit or yogurt  Tea or coffee (no cream or milk added)  Gatorade / Powerade  G2 / Powerade Zero    • Infants may have breast milk up to four hours before surgery.  • Infants drinking formula may drink formula up to six hours before surgery.   • Patients who avoid smoking, chewing tobacco and alcohol for 4 weeks prior to surgery have a reduced risk of post-operative complications.  Quit smoking as many days before surgery as you can.  • Do not smoke, use chewing tobacco or drink alcohol the day of surgery.   • If applicable bring your C-PAP/ BI-PAP machine.  • Bring any papers given to you in the doctor’s office.  • Wear clean comfortable clothes and socks.  • Do not wear contact lenses or make-up.  Bring a case for your glasses.   • Bring crutches or walker if applicable.  • Remove all piercings.  Leave jewelry and any other valuables at home.  • Hair extensions with metal clips must be removed prior to surgery.  • The  Pre-Admission Testing nurse will instruct you to bring medications if unable to obtain an accurate list in Pre-Admission Testing.        If you were given a blood bank ID arm band remember to bring it with you the day of surgery.    Preventing a Surgical Site Infection:  • For 2 to 3 days before surgery, avoid shaving with a razor because the razor can irritate skin and make it easier to develop an infection.  • The night prior to surgery sleep in a clean bed with clean clothing.  Do not allow pets to sleep with you.  • Shower on the morning of surgery using a fresh bar of anti-bacterial soap (such as Dial) and clean washcloth.  Dry with a clean towel and dress in clean clothing.  • Ask your surgeon if you will be receiving antibiotics prior to surgery.  • Make sure you, your family, and all healthcare providers clean their hands with soap and water or an alcohol based hand  before caring for you or your wound.    Day of surgery:  Upon arrival, a Pre-op nurse and Anesthesiologist will review your health history, obtain vital signs, and answer questions you may have.  The only belongings needed at this time will be your home medications and if applicable your C-PAP/BI-PAP machine.  If you are staying overnight your family can leave the rest of your belongings in the car and bring them to your room later.  A Pre-op nurse will start an IV and you may receive medication in preparation for surgery, including something to help you relax.  Your family will be able to see you in the Pre-op area.  While you are in surgery your family should notify the waiting room  if they leave the waiting room area and provide a contact phone number.    Please be aware that surgery does come with discomfort.  We want to make every effort to control your discomfort so please discuss any uncontrolled symptoms with your nurse.   Your doctor will most likely have prescribed pain medications.      If you are going home  after surgery you will receive individualized written care instructions before being discharged.  A responsible adult must drive you to and from the hospital on the day of your surgery and stay with you for 24 hours.    If you are staying overnight following surgery, you will be transported to your hospital room following the recovery period.  [facility] has all private rooms.    If you have any questions please call Pre-Admission Testing at [phone].  Deductibles and co-payments are collected on the day of service. Please be prepared to pay the required co-pay, deductible or deposit on the day of service as defined by your plan.

## 2018-01-09 ENCOUNTER — ANESTHESIA (OUTPATIENT)
Dept: PERIOP | Facility: HOSPITAL | Age: 83
End: 2018-01-09

## 2018-01-09 ENCOUNTER — HOSPITAL ENCOUNTER (OUTPATIENT)
Facility: HOSPITAL | Age: 83
Setting detail: HOSPITAL OUTPATIENT SURGERY
Discharge: HOME | End: 2018-01-09
Attending: OPHTHALMOLOGY | Admitting: OPHTHALMOLOGY

## 2018-01-09 ENCOUNTER — ANESTHESIA EVENT (OUTPATIENT)
Dept: PERIOP | Facility: HOSPITAL | Age: 83
End: 2018-01-09

## 2018-01-09 VITALS
SYSTOLIC BLOOD PRESSURE: 134 MMHG | HEART RATE: 74 BPM | WEIGHT: 143.5 LBS | OXYGEN SATURATION: 95 % | DIASTOLIC BLOOD PRESSURE: 88 MMHG | BODY MASS INDEX: 23.88 KG/M2 | TEMPERATURE: 97.5 F | RESPIRATION RATE: 16 BRPM

## 2018-01-09 PROCEDURE — 25010000002 PROPOFOL 10 MG/ML EMULSION: Performed by: NURSE ANESTHETIST, CERTIFIED REGISTERED

## 2018-01-09 RX ORDER — DIPHENHYDRAMINE HYDROCHLORIDE 50 MG/ML
12.5 INJECTION INTRAMUSCULAR; INTRAVENOUS
Status: DISCONTINUED | OUTPATIENT
Start: 2018-01-09 | End: 2018-01-09 | Stop reason: HOSPADM

## 2018-01-09 RX ORDER — HYDRALAZINE HYDROCHLORIDE 20 MG/ML
5 INJECTION INTRAMUSCULAR; INTRAVENOUS
Status: DISCONTINUED | OUTPATIENT
Start: 2018-01-09 | End: 2018-01-09 | Stop reason: HOSPADM

## 2018-01-09 RX ORDER — TETRACAINE HYDROCHLORIDE 5 MG/ML
SOLUTION OPHTHALMIC AS NEEDED
Status: DISCONTINUED | OUTPATIENT
Start: 2018-01-09 | End: 2018-01-09 | Stop reason: HOSPADM

## 2018-01-09 RX ORDER — ACETAMINOPHEN 325 MG/1
650 TABLET ORAL EVERY 4 HOURS PRN
Qty: 1 BOTTLE | Refills: 0 | Status: SHIPPED | OUTPATIENT
Start: 2018-01-09 | End: 2018-04-25 | Stop reason: SDUPTHER

## 2018-01-09 RX ORDER — ERYTHROMYCIN 5 MG/G
OINTMENT OPHTHALMIC AS NEEDED
Status: DISCONTINUED | OUTPATIENT
Start: 2018-01-09 | End: 2018-01-09 | Stop reason: HOSPADM

## 2018-01-09 RX ORDER — PROPOFOL 10 MG/ML
VIAL (ML) INTRAVENOUS CONTINUOUS PRN
Status: DISCONTINUED | OUTPATIENT
Start: 2018-01-09 | End: 2018-01-09 | Stop reason: SURG

## 2018-01-09 RX ORDER — SODIUM CHLORIDE, SODIUM LACTATE, POTASSIUM CHLORIDE, CALCIUM CHLORIDE 600; 310; 30; 20 MG/100ML; MG/100ML; MG/100ML; MG/100ML
9 INJECTION, SOLUTION INTRAVENOUS CONTINUOUS
Status: DISCONTINUED | OUTPATIENT
Start: 2018-01-09 | End: 2018-01-09 | Stop reason: HOSPADM

## 2018-01-09 RX ORDER — LIDOCAINE HYDROCHLORIDE 10 MG/ML
0.5 INJECTION, SOLUTION EPIDURAL; INFILTRATION; INTRACAUDAL; PERINEURAL ONCE AS NEEDED
Status: DISCONTINUED | OUTPATIENT
Start: 2018-01-09 | End: 2018-01-09 | Stop reason: HOSPADM

## 2018-01-09 RX ORDER — NALBUPHINE HCL 10 MG/ML
10 AMPUL (ML) INJECTION EVERY 4 HOURS PRN
Status: DISCONTINUED | OUTPATIENT
Start: 2018-01-09 | End: 2018-01-09 | Stop reason: HOSPADM

## 2018-01-09 RX ORDER — ACETAMINOPHEN 325 MG/1
650 TABLET ORAL ONCE AS NEEDED
Status: COMPLETED | OUTPATIENT
Start: 2018-01-09 | End: 2018-01-09

## 2018-01-09 RX ORDER — SODIUM CHLORIDE 0.9 % (FLUSH) 0.9 %
1-10 SYRINGE (ML) INJECTION AS NEEDED
Status: DISCONTINUED | OUTPATIENT
Start: 2018-01-09 | End: 2018-01-09 | Stop reason: HOSPADM

## 2018-01-09 RX ORDER — NALBUPHINE HCL 10 MG/ML
2 AMPUL (ML) INJECTION EVERY 4 HOURS PRN
Status: DISCONTINUED | OUTPATIENT
Start: 2018-01-09 | End: 2018-01-09 | Stop reason: HOSPADM

## 2018-01-09 RX ORDER — ERYTHROMYCIN 5 MG/G
OINTMENT OPHTHALMIC 2 TIMES DAILY
Qty: 3.5 G | Refills: 0 | Status: SHIPPED | OUTPATIENT
Start: 2018-01-09 | End: 2018-01-19

## 2018-01-09 RX ORDER — HYDROCODONE BITARTRATE AND ACETAMINOPHEN 5; 325 MG/1; MG/1
1 TABLET ORAL ONCE AS NEEDED
Status: DISCONTINUED | OUTPATIENT
Start: 2018-01-09 | End: 2018-01-09 | Stop reason: HOSPADM

## 2018-01-09 RX ORDER — FENTANYL CITRATE 50 UG/ML
25 INJECTION, SOLUTION INTRAMUSCULAR; INTRAVENOUS
Status: DISCONTINUED | OUTPATIENT
Start: 2018-01-09 | End: 2018-01-09 | Stop reason: HOSPADM

## 2018-01-09 RX ORDER — ACETAMINOPHEN 650 MG/1
650 SUPPOSITORY RECTAL ONCE AS NEEDED
Status: DISCONTINUED | OUTPATIENT
Start: 2018-01-09 | End: 2018-01-09 | Stop reason: SDUPTHER

## 2018-01-09 RX ORDER — MAGNESIUM HYDROXIDE 1200 MG/15ML
LIQUID ORAL AS NEEDED
Status: DISCONTINUED | OUTPATIENT
Start: 2018-01-09 | End: 2018-01-09 | Stop reason: HOSPADM

## 2018-01-09 RX ORDER — PROPOFOL 10 MG/ML
VIAL (ML) INTRAVENOUS AS NEEDED
Status: DISCONTINUED | OUTPATIENT
Start: 2018-01-09 | End: 2018-01-09 | Stop reason: SURG

## 2018-01-09 RX ORDER — ACETAMINOPHEN 325 MG/1
650 TABLET ORAL ONCE AS NEEDED
Status: DISCONTINUED | OUTPATIENT
Start: 2018-01-09 | End: 2018-01-09 | Stop reason: SDUPTHER

## 2018-01-09 RX ORDER — NALOXONE HCL 0.4 MG/ML
0.4 VIAL (ML) INJECTION AS NEEDED
Status: DISCONTINUED | OUTPATIENT
Start: 2018-01-09 | End: 2018-01-09 | Stop reason: HOSPADM

## 2018-01-09 RX ADMIN — ACETAMINOPHEN 650 MG: 325 TABLET ORAL at 08:57

## 2018-01-09 RX ADMIN — PROPOFOL 20 MG: 10 INJECTION, EMULSION INTRAVENOUS at 07:26

## 2018-01-09 RX ADMIN — SODIUM CHLORIDE, POTASSIUM CHLORIDE, SODIUM LACTATE AND CALCIUM CHLORIDE 9 ML/HR: 600; 310; 30; 20 INJECTION, SOLUTION INTRAVENOUS at 06:13

## 2018-01-09 RX ADMIN — PROPOFOL 20 MG: 10 INJECTION, EMULSION INTRAVENOUS at 07:23

## 2018-01-09 RX ADMIN — PROPOFOL 50 MCG/KG/MIN: 10 INJECTION, EMULSION INTRAVENOUS at 07:23

## 2018-01-09 NOTE — ANESTHESIA PREPROCEDURE EVALUATION
Anesthesia Evaluation     no history of anesthetic complications:  NPO Solid Status: > 8 hours       Airway   Mallampati: II  TM distance: >3 FB  Neck ROM: full  Dental      Pulmonary - negative pulmonary ROS and normal exam   (-) wheezes  Cardiovascular     ECG reviewed  Rhythm: regular    (+) hypertension, hyperlipidemia  (-) murmur, carotid bruits    ROS comment: NSR- poor quality ECG but P waves in precordial leads    Neuro/Psych  (+) seizures, CVA, psychiatric history,       ROS Comment: Nebulous hx of sz jalil stroke. Uncertain if it is GTC or otherwise, on kepra but no sz since.    No obvious post stroke deficit  GI/Hepatic/Renal/Endo    (+)  GERD, hypothyroidism,     Musculoskeletal     Abdominal  - normal exam   Substance History      OB/GYN          Other   (+) arthritis                                           Anesthesia Plan    ASA 3     general   (Listed as GA also discussed with pt and family MAC if possible.    89 yo- avoid benzo, minimize narcotics, consider TIVA or MAC w propofol and local.)  intravenous induction

## 2018-01-09 NOTE — PLAN OF CARE
Problem: Patient Care Overview (Adult)  Goal: Plan of Care Review  Outcome: Ongoing (interventions implemented as appropriate)   01/09/18 0545   Coping/Psychosocial Response Interventions   Plan Of Care Reviewed With patient;family   Patient Care Overview   Progress improving     Goal: Discharge Needs Assessment  Outcome: Ongoing (interventions implemented as appropriate)   01/09/18 0545   Discharge Needs Assessment   Concerns To Be Addressed denies needs/concerns at this time       Problem: Perioperative Period (Adult)  Goal: Signs and Symptoms of Listed Potential Problems Will be Absent or Manageable (Perioperative Period)  Outcome: Ongoing (interventions implemented as appropriate)   01/09/18 0545   Perioperative Period   Problems Assessed (Perioperative Period) all

## 2018-01-09 NOTE — PLAN OF CARE
Problem: Perioperative Period (Adult)  Goal: Signs and Symptoms of Listed Potential Problems Will be Absent or Manageable (Perioperative Period)  Outcome: Outcome(s) achieved Date Met: 01/09/18 01/09/18 0913   Perioperative Period   Problems Assessed (Perioperative Period) all   Problems Present (Perioperative Period) none

## 2018-01-09 NOTE — OP NOTE
`OPERATIVE NOTE    Patient Identification:  Name: Lianna Hawthorne  Age: 89 y.o.  Sex: female  :  1925  MRN: [id number]                                               Preoperative diagnosis: right lower eyelid cicatricial ectropion  Postoperative diagnosis: same  Procedure: right lower eyelid ectropion repair, right midface lift  Surgeon: Dr. Sanya Rivero  who was present and scrubbed throughout all critical portions of the operation  Assistants:  Alvin Shirley MD  Anesthesia: MAC  EBL: less than 50cc  Specimens: none    Description of the procedure:     The patient was taken to the operating room and placed on the table in the supine position, where anesthesia was induced. 2% lidocaine with epinephrine and 0.5% marcaine in a 1:1 fashion was injected over the surgical site, and the patient was prepped and draped in the usual manner for orbitofacial surgery.     Corneal protectors were placed in both eyes.     A 15 Bard-Dg blade incision was made at the right lateral canthus. Sharp dissection was carried down to the lateral orbital rim periosteum and was carried laterally 1.5cm. The inferior ramus of the lateral canthal tendon was identified and severed with sharp dissection.     A second incision was made 2 mm below the eyelash margin, across the entire horizontal extent of the eyelid. A sharp dissection plane was carried out below the orbicularis muscle layer until the entire lower lid was undermined. The orbital septum was opened horizontally, sharp dissection was carried further down to periosteum along the lateral aspect of the inferior orbital rim. A Tom elevator was used to lift periosteum from the bone and the superior edge of SMAS identified and isolated through sharp dissectionBleeding was controlled with electrocauterization. The SMAS was then elevated and attached to the inferior orbital rim using multiple interrupted 4-0 Prolene sutures. The skin muscle flap was then advanced superiorly  and temporally. The skin muscle flap was anchored superiorly with a 5-0 vicryl suture anchored to the lateral orbital rim periosteum.      A full-thickness en bloc excision of the lateral aspect of the tarsal plate was carried out with sharp dissection, and bleeding was controlled with electrocauterization. The cut edge of the tarsal plate was advanced to the lateral orbital rim periosteum, where it was sutured with 5-0 vicryl suture to the internal aspect of the lateral orbital rim periosteum. The skin  was closed with 5-0 fast absorbing suture.     The corneal protectors were removed and antibiotic ophthalmic ointment was placed over the surgical site.     The patient was then awakened and taken from the operating room in good condition, having tolerated the procedure well. There were no complications, and the estimated blood loss was less than 50 cc.

## 2018-01-09 NOTE — PLAN OF CARE
Problem: Patient Care Overview (Adult)  Goal: Plan of Care Review  Outcome: Outcome(s) achieved Date Met: 01/09/18 01/09/18 0913   Coping/Psychosocial Response Interventions   Plan Of Care Reviewed With patient;daughter   Patient Care Overview   Progress improving     Goal: Adult Individualization and Mutuality  Outcome: Outcome(s) achieved Date Met: 01/09/18    Goal: Discharge Needs Assessment  Outcome: Outcome(s) achieved Date Met: 01/09/18 01/09/18 0913   Discharge Needs Assessment   Concerns To Be Addressed no discharge needs identified

## 2018-01-09 NOTE — H&P
as above, no new meds/health problems since office visit    10 point review of systems negative, except pertaining to the HPI    Physical Exam   Constitutional: Appears well-developed.   HENT:   Head: Normocephalic.   Eyes:   RLL ectropion  Neck: Normal range of motion.   Cardiovascular: Normal rate.    Pulmonary/Chest: Effort normal.   Neurological: Alert.   Skin: Skin is warm.   Psychiatric: Normal mood and affect.

## 2018-01-09 NOTE — PLAN OF CARE
Problem: Patient Care Overview (Adult)  Goal: Adult Individualization and Mutuality  Outcome: Ongoing (interventions implemented as appropriate)   01/09/18 1393   Mutuality/Individual Preferences   What Information Would Help Us Give You More Personalized Care? hard of hearing, not wearing hearing aids

## 2018-01-09 NOTE — ANESTHESIA POSTPROCEDURE EVALUATION
Patient: Lianna MARSHALL Fedde    Procedure Summary     Date Anesthesia Start Anesthesia Stop Room / Location    01/09/18 0716 0811  [facility]  /  [facility]       Procedure Diagnosis Surgeon Provider    RIGHT LOWER LID ECTROPION REPAIR (Right Eye);  MID FACE LIFT (N/A Face) No diagnosis on file. MD Óscar Ordoñez MD          Anesthesia Type: general  Last vitals  BP   134/88 (01/09/18 0836)   Temp   36.4 °C (97.5 °F) (01/09/18 0812)   Pulse   74 (01/09/18 0836)   Resp   16 (01/09/18 0836)     SpO2   95 % (01/09/18 0836)     Post Anesthesia Care and Evaluation    Patient location during evaluation: bedside  Patient participation: complete - patient participated  Level of consciousness: awake  Pain score: 1  Pain management: adequate  Airway patency: patent  Anesthetic complications: No anesthetic complications    Cardiovascular status: acceptable  Respiratory status: acceptable  Hydration status: acceptable    Comments: --------------------            01/09/18 0836     --------------------   BP:       134/88     Pulse:      74       Resp:       16       Temp:                SpO2:      95%      --------------------

## 2018-01-18 PROCEDURE — 77427 RADIATION TX MANAGEMENT X5: CPT | Performed by: RADIOLOGY

## 2018-01-18 PROCEDURE — 77412 RADIATION TX DELIVERY LVL 3: CPT | Performed by: RADIOLOGY

## 2018-01-19 PROCEDURE — 77412 RADIATION TX DELIVERY LVL 3: CPT | Performed by: RADIOLOGY

## 2018-01-22 PROCEDURE — 77412 RADIATION TX DELIVERY LVL 3: CPT | Performed by: RADIOLOGY

## 2018-01-23 PROCEDURE — 77412 RADIATION TX DELIVERY LVL 3: CPT | Performed by: RADIOLOGY

## 2018-01-23 PROCEDURE — 77336 RADIATION PHYSICS CONSULT: CPT | Performed by: RADIOLOGY

## 2018-01-24 ENCOUNTER — RADIATION ONCOLOGY WEEKLY ASSESSMENT (OUTPATIENT)
Dept: RADIATION ONCOLOGY | Facility: HOSPITAL | Age: 83
End: 2018-01-24

## 2018-01-24 VITALS
HEART RATE: 82 BPM | SYSTOLIC BLOOD PRESSURE: 128 MMHG | OXYGEN SATURATION: 95 % | RESPIRATION RATE: 16 BRPM | TEMPERATURE: 97 F | DIASTOLIC BLOOD PRESSURE: 67 MMHG

## 2018-01-24 DIAGNOSIS — C44.311 BASAL CELL CARCINOMA OF SKIN OF NOSE: Primary | ICD-10-CM

## 2018-01-24 PROCEDURE — 77412 RADIATION TX DELIVERY LVL 3: CPT | Performed by: RADIOLOGY

## 2018-01-24 NOTE — PROGRESS NOTES
Physician Weekly Management Note    Diagnosis:     Diagnosis Plan   1. Basal cell carcinoma of skin of nose         RT Details:   Treatment #20/37    Notes on Treatment course, Films, Medical progress:  Patient's daughter asked to be seen today the patient has a little bit of irritation in the left medial canthus.  We reviewed our treatment portals well away from the canthus unlikely to related radiation.  Continue as planned.    Weekly Management:  Medication reviewed?   Yes  New medications given?   No  Problemlist reviewed?   Yes  Problem added?   No      Technical aspects reviewed:  Weekly OBI approved?   Yes  Weekly port films approved?   Yes  Change requests noted on port film?   No  Patient setup and plan reviewed?   Yes    Chart Reviewed:  Continue current treatment plan?   Yes  Treatment plan change requested?   No  CBC reviewed?   No  Concurrent Chemo?   No    Objective     Toxicities:   As above     Review of Systems   As above    Vitals:    01/24/18 1117   BP: 128/67   Pulse: 82   Resp: 16   Temp: 97 °F (36.1 °C)   SpO2: 95%       No flowsheet data found.    Physical Exam  As above      Problem Summary List    Diagnosis:     Diagnosis Plan   1. Basal cell carcinoma of skin of nose       Pathology:       Past Medical History:   Diagnosis Date   • Anemia    • Anemia    • Arthritis    • Basal cell carcinoma of ala nasi 11/7/2017    BASAL CELL CANCER RT SIDE NOSE WITH 15 RADIATION TX'S  NO SURGERY DONE   • Cerebral hemorrhage 8/15/2014   • Closed fracture of proximal end of humerus 5/15/2015   • Depression    • Disease of thyroid gland    • Diverticulosis of intestine 3/10/2016   • GERD (gastroesophageal reflux disease)    • Hyperlipidemia    • Hypertension    • Irritable bowel syndrome    • Low back pain    • Osteopenia    • Renal insufficiency     STAGE 1V   • Seizures    • Stroke     HEMORRATIC STROKE JAN 2013   • Visual impairment     magular degeneration         Past Surgical History:   Procedure  Laterality Date   • BROW LIFT N/A 1/9/2018    Procedure:  MID FACE LIFT;  Surgeon: Sanya Rivero MD;  [place] [facility] [place] [facility] [place]  Service:    • CHOLECYSTECTOMY     • COLONOSCOPY     • ECTROPION REPAIR Right 1/9/2018    Procedure: RIGHT LOWER LID ECTROPION REPAIR;  Surgeon: Sanya Rivero MD;  [place]  [facility] [place] [facility] [place]  Service:    • EYE SURGERY      cataracts removed   • HYSTERECTOMY     • TONSILLECTOMY           Current Outpatient Prescriptions on File Prior to Visit   Medication Sig Dispense Refill   • acetaminophen (TYLENOL) 325 MG tablet Take 650 mg by mouth every 6 (six) hours.     • acetaminophen (TYLENOL) 325 MG tablet Take 2 tablets by mouth Every 4 (Four) Hours As Needed for Mild Pain . 1 bottle 0   • amLODIPine (NORVASC) 10 MG tablet Take 10 mg by mouth Every Evening.     • B Complex Vitamins (VITAMIN B COMPLEX) tablet Take 1 tablet by mouth Daily.     • Cranberry 1000 MG capsule Take 500 mg by mouth Every Morning.     • fluticasone (FLONASE) 50 MCG/ACT nasal spray 2 sprays into each nostril Daily. (Patient taking differently: 2 sprays into each nostril Every Morning.) 1 each 5   • furosemide (LASIX) 40 MG tablet Take 40 mg by mouth Every Morning.     • hyoscyamine (LEVBID) 0.375 MG 12 hr tablet Take 0.375 mg by mouth Every 12 (Twelve) Hours As Needed for Cramping.     • lansoprazole (PREVACID) 30 MG capsule Take 30 mg by mouth Every Morning.     • levETIRAcetam (KEPPRA) 250 MG tablet Take 250 mg by mouth 2 (Two) Times a Day. HEMORRATIC STROKE 2013   HAS BEEN ON MED SINCE THEN     • levothyroxine (SYNTHROID, LEVOTHROID) 25 MCG tablet Take 1 tablet by mouth Every Morning. 90 tablet 0   • mirtazapine (REMERON) 15 MG tablet Take 15 mg by mouth Every Night.     • Multiple Vitamins-Minerals (VITEYES AREDS ADVANCED) capsule Take 1 tablet by mouth Daily.     • olopatadine (PATANASE) 0.6 % solution nasal solution 2 sprays into each nostril 2 (Two) Times a Day. 1 bottle 5   • Probiotic Product  (PROBIOTIC DAILY PO) Take 1 tablet by mouth Every Morning.     • simvastatin (ZOCOR) 20 MG tablet Take 20 mg by mouth Every Night.       No current facility-administered medications on file prior to visit.        Allergies   Allergen Reactions   • Sulfa Antibiotics Rash   • Azithromycin Diarrhea   • Risedronate Sodium      Other reaction(s): Other (See Comments)  ACHES   • Ciprofloxacin Hcl GI Intolerance   • Hydrocodone-Acetaminophen GI Intolerance         Primary care MD:    Mike Ricketts MD    Oncologist:     Seen and approved by:  Lavell Merlos MD  01/24/2018

## 2018-01-25 PROCEDURE — 77412 RADIATION TX DELIVERY LVL 3: CPT | Performed by: RADIOLOGY

## 2018-01-25 PROCEDURE — 77427 RADIATION TX MANAGEMENT X5: CPT | Performed by: RADIOLOGY

## 2018-01-26 PROCEDURE — 77412 RADIATION TX DELIVERY LVL 3: CPT | Performed by: RADIOLOGY

## 2018-01-29 PROCEDURE — 77412 RADIATION TX DELIVERY LVL 3: CPT | Performed by: RADIOLOGY

## 2018-01-30 ENCOUNTER — RADIATION ONCOLOGY WEEKLY ASSESSMENT (OUTPATIENT)
Dept: RADIATION ONCOLOGY | Facility: HOSPITAL | Age: 83
End: 2018-01-30

## 2018-01-30 VITALS
SYSTOLIC BLOOD PRESSURE: 132 MMHG | DIASTOLIC BLOOD PRESSURE: 60 MMHG | TEMPERATURE: 97.9 F | OXYGEN SATURATION: 94 % | RESPIRATION RATE: 16 BRPM | HEART RATE: 80 BPM

## 2018-01-30 DIAGNOSIS — C44.311 BASAL CELL CARCINOMA OF SKIN OF NOSE: Primary | ICD-10-CM

## 2018-01-30 PROCEDURE — 77412 RADIATION TX DELIVERY LVL 3: CPT | Performed by: RADIOLOGY

## 2018-01-30 PROCEDURE — 77336 RADIATION PHYSICS CONSULT: CPT | Performed by: RADIOLOGY

## 2018-01-30 NOTE — PROGRESS NOTES
Physician Weekly Management Note    Diagnosis:     Diagnosis Plan   1. Basal cell carcinoma of skin of nose         RT Details:  Treatment #24/37    Notes on Treatment course, Films, Medical progress:   Reports no intranasal pain or congestion.  No bleeding.  Erythema in the treatment portal on exam.  Patient reports fatigue.  Continue as planned.    Weekly Management:  Medication reviewed?   Yes  New medications given?   No  Problemlist reviewed?   Yes  Problem added?   No      Technical aspects reviewed:  Weekly OBI approved?   Yes  Weekly port films approved?   Yes  Change requests noted on port film?   No  Patient setup and plan reviewed?   Yes    Chart Reviewed:  Continue current treatment plan?   Yes  Treatment plan change requested?   No  CBC reviewed?   No  Concurrent Chemo?   No    Objective     Toxicities:    As above     Review of Systems   As above    Vitals:    01/30/18 1122   BP: 132/60   Pulse: 80   Resp: 16   Temp: 97.9 °F (36.6 °C)   SpO2: 94%       No flowsheet data found.    Physical Exam  As above      Problem Summary List    Diagnosis:     Diagnosis Plan   1. Basal cell carcinoma of skin of nose       Pathology:       Past Medical History:   Diagnosis Date   • Anemia    • Anemia    • Arthritis    • Basal cell carcinoma of ala nasi 11/7/2017    BASAL CELL CANCER RT SIDE NOSE WITH 15 RADIATION TX'S  NO SURGERY DONE   • Cerebral hemorrhage 8/15/2014   • Closed fracture of proximal end of humerus 5/15/2015   • Depression    • Disease of thyroid gland    • Diverticulosis of intestine 3/10/2016   • GERD (gastroesophageal reflux disease)    • Hyperlipidemia    • Hypertension    • Irritable bowel syndrome    • Low back pain    • Osteopenia    • Renal insufficiency     STAGE 1V   • Seizures    • Stroke     HEMORRATIC STROKE JAN 2013   • Visual impairment     magular degeneration         Past Surgical History:   Procedure Laterality Date   • BROW LIFT N/A 1/9/2018    Procedure:  MID FACE LIFT;  Surgeon:  Sanya Rivero MD;  Location: [facility] OR [facility] [place];  Service:    • CHOLECYSTECTOMY     • COLONOSCOPY     • ECTROPION REPAIR Right 1/9/2018    Procedure: RIGHT LOWER LID ECTROPION REPAIR;  Surgeon: Sanya Rivero MD;  Location: [facility] OR [facility] [place];  Service:    • EYE SURGERY      cataracts removed   • HYSTERECTOMY     • TONSILLECTOMY           Current Outpatient Prescriptions on File Prior to Visit   Medication Sig Dispense Refill   • acetaminophen (TYLENOL) 325 MG tablet Take 650 mg by mouth every 6 (six) hours.     • acetaminophen (TYLENOL) 325 MG tablet Take 2 tablets by mouth Every 4 (Four) Hours As Needed for Mild Pain . 1 bottle 0   • amLODIPine (NORVASC) 10 MG tablet Take 10 mg by mouth Every Evening.     • B Complex Vitamins (VITAMIN B COMPLEX) tablet Take 1 tablet by mouth Daily.     • Cranberry 1000 MG capsule Take 500 mg by mouth Every Morning.     • fluticasone (FLONASE) 50 MCG/ACT nasal spray 2 sprays into each nostril Daily. (Patient taking differently: 2 sprays into each nostril Every Morning.) 1 each 5   • furosemide (LASIX) 40 MG tablet Take 40 mg by mouth Every Morning.     • hyoscyamine (LEVBID) 0.375 MG 12 hr tablet Take 0.375 mg by mouth Every 12 (Twelve) Hours As Needed for Cramping.     • lansoprazole (PREVACID) 30 MG capsule Take 30 mg by mouth Every Morning.     • levETIRAcetam (KEPPRA) 250 MG tablet Take 250 mg by mouth 2 (Two) Times a Day. HEMORRATIC STROKE 2013   HAS BEEN ON MED SINCE THEN     • levothyroxine (SYNTHROID, LEVOTHROID) 25 MCG tablet Take 1 tablet by mouth Every Morning. 90 tablet 0   • mirtazapine (REMERON) 15 MG tablet Take 15 mg by mouth Every Night.     • Multiple Vitamins-Minerals (VITEYES AREDS ADVANCED) capsule Take 1 tablet by mouth Daily.     • olopatadine (PATANASE) 0.6 % solution nasal solution 2 sprays into each nostril 2 (Two) Times a Day. 1 bottle 5   • Probiotic Product (PROBIOTIC DAILY PO) Take 1 tablet by mouth Every Morning.     • simvastatin (ZOCOR) 20 MG  tablet Take 20 mg by mouth Every Night.       No current facility-administered medications on file prior to visit.        Allergies   Allergen Reactions   • Sulfa Antibiotics Rash   • Azithromycin Diarrhea   • Risedronate Sodium      Other reaction(s): Other (See Comments)  ACHES   • Ciprofloxacin Hcl GI Intolerance   • Hydrocodone-Acetaminophen GI Intolerance        Primary care MD:    Mike Ricketts MD    Oncologist:      Seen and approved by:  Lavell Merlos MD  01/30/2018

## 2018-01-31 ENCOUNTER — TELEPHONE (OUTPATIENT)
Dept: RADIATION ONCOLOGY | Facility: HOSPITAL | Age: 83
End: 2018-01-31

## 2018-01-31 NOTE — TELEPHONE ENCOUNTER
"Therapists received call from Ms Hawthorne's daughter stating Ms Hawthorne has a virus and will not return until MOnday if better. The home she is living in has \"an outbreak\" and is in quarantine.  "

## 2018-02-01 ENCOUNTER — APPOINTMENT (OUTPATIENT)
Dept: RADIATION ONCOLOGY | Facility: HOSPITAL | Age: 83
End: 2018-02-01

## 2018-02-01 ENCOUNTER — TELEPHONE (OUTPATIENT)
Dept: RADIATION ONCOLOGY | Facility: HOSPITAL | Age: 83
End: 2018-02-01

## 2018-02-02 PROCEDURE — 77412 RADIATION TX DELIVERY LVL 3: CPT | Performed by: RADIOLOGY

## 2018-02-05 ENCOUNTER — RADIATION ONCOLOGY WEEKLY ASSESSMENT (OUTPATIENT)
Dept: RADIATION ONCOLOGY | Facility: HOSPITAL | Age: 83
End: 2018-02-05

## 2018-02-05 VITALS
TEMPERATURE: 98.5 F | DIASTOLIC BLOOD PRESSURE: 64 MMHG | RESPIRATION RATE: 16 BRPM | SYSTOLIC BLOOD PRESSURE: 130 MMHG | HEART RATE: 60 BPM | OXYGEN SATURATION: 98 %

## 2018-02-05 DIAGNOSIS — C44.311 BASAL CELL CARCINOMA OF SKIN OF NOSE: Primary | ICD-10-CM

## 2018-02-05 PROCEDURE — 77412 RADIATION TX DELIVERY LVL 3: CPT | Performed by: RADIOLOGY

## 2018-02-05 PROCEDURE — 77427 RADIATION TX MANAGEMENT X5: CPT | Performed by: RADIOLOGY

## 2018-02-05 NOTE — PROGRESS NOTES
Physician Weekly Management Note    Diagnosis:     Diagnosis Plan   1. Basal cell carcinoma of skin of nose         RT Details:  #26/37    Notes on Treatment course, Films, Medical progress:    Continues to do well.  No significant bleeding, moving air well through the right nostril, no congestion, no pain.  Little bit of erythema on the right cheek, continue as planned.    Weekly Management:  Medication reviewed?   Yes  New medications given?   No  Problemlist reviewed?   Yes  Problem added?   No      Technical aspects reviewed:  Weekly OBI approved?   Yes  Weekly port films approved?   Yes  Change requests noted on port film?   No  Patient setup and plan reviewed?   Yes    Chart Reviewed:  Continue current treatment plan?   Yes  Treatment plan change requested?   No  CBC reviewed?   No  Concurrent Chemo?   No    Objective     Toxicities:    As above     Review of Systems   As above    Vitals:    02/05/18 1117   BP: 130/64   Pulse: 60   Resp: 16   Temp: 98.5 °F (36.9 °C)   SpO2: 98%       No flowsheet data found.    Physical Exam  As above      Problem Summary List    Diagnosis:     Diagnosis Plan   1. Basal cell carcinoma of skin of nose       Pathology:       Past Medical History:   Diagnosis Date   • Anemia    • Anemia    • Arthritis    • Basal cell carcinoma of ala nasi 11/7/2017    BASAL CELL CANCER RT SIDE NOSE WITH 15 RADIATION TX'S  NO SURGERY DONE   • Cerebral hemorrhage 8/15/2014   • Closed fracture of proximal end of humerus 5/15/2015   • Depression    • Disease of thyroid gland    • Diverticulosis of intestine 3/10/2016   • GERD (gastroesophageal reflux disease)    • Hyperlipidemia    • Hypertension    • Irritable bowel syndrome    • Low back pain    • Osteopenia    • Renal insufficiency     STAGE 1V   • Seizures    • Stroke     HEMORRATIC STROKE JAN 2013   • Visual impairment     magular degeneration         Past Surgical History:   Procedure Laterality Date   • BROW LIFT N/A 1/9/2018    Procedure:   MID FACE LIFT;  Surgeon: Sanya Rivero MD;  [place] [facility] [place] [facility] [place]  Service:    • CHOLECYSTECTOMY     • COLONOSCOPY     • ECTROPION REPAIR Right 1/9/2018    Procedure: RIGHT LOWER LID ECTROPION REPAIR;  Surgeon: Sanya Rivero MD;  [place] [facility] [place] [facility] [place]  Service:    • EYE SURGERY      cataracts removed   • HYSTERECTOMY     • TONSILLECTOMY           Current Outpatient Prescriptions on File Prior to Visit   Medication Sig Dispense Refill   • acetaminophen (TYLENOL) 325 MG tablet Take 650 mg by mouth every 6 (six) hours.     • acetaminophen (TYLENOL) 325 MG tablet Take 2 tablets by mouth Every 4 (Four) Hours As Needed for Mild Pain . 1 bottle 0   • amLODIPine (NORVASC) 10 MG tablet Take 10 mg by mouth Every Evening.     • B Complex Vitamins (VITAMIN B COMPLEX) tablet Take 1 tablet by mouth Daily.     • Cranberry 1000 MG capsule Take 500 mg by mouth Every Morning.     • fluticasone (FLONASE) 50 MCG/ACT nasal spray 2 sprays into each nostril Daily. (Patient taking differently: 2 sprays into each nostril Every Morning.) 1 each 5   • furosemide (LASIX) 40 MG tablet Take 40 mg by mouth Every Morning.     • hyoscyamine (LEVBID) 0.375 MG 12 hr tablet Take 0.375 mg by mouth Every 12 (Twelve) Hours As Needed for Cramping.     • lansoprazole (PREVACID) 30 MG capsule Take 30 mg by mouth Every Morning.     • levETIRAcetam (KEPPRA) 250 MG tablet Take 250 mg by mouth 2 (Two) Times a Day. HEMORRATIC STROKE 2013   HAS BEEN ON MED SINCE THEN     • levothyroxine (SYNTHROID, LEVOTHROID) 25 MCG tablet Take 1 tablet by mouth Every Morning. 90 tablet 0   • mirtazapine (REMERON) 15 MG tablet Take 15 mg by mouth Every Night.     • Multiple Vitamins-Minerals (VITEYES AREDS ADVANCED) capsule Take 1 tablet by mouth Daily.     • olopatadine (PATANASE) 0.6 % solution nasal solution 2 sprays into each nostril 2 (Two) Times a Day. 1 bottle 5   • Probiotic Product (PROBIOTIC DAILY PO) Take 1 tablet by mouth Every Morning.     •  simvastatin (ZOCOR) 20 MG tablet Take 20 mg by mouth Every Night.       No current facility-administered medications on file prior to visit.        Allergies   Allergen Reactions   • Sulfa Antibiotics Rash   • Azithromycin Diarrhea   • Risedronate Sodium      Other reaction(s): Other (See Comments)  ACHES   • Ciprofloxacin Hcl GI Intolerance   • Hydrocodone-Acetaminophen GI Intolerance       Primary care MD:    Mike Ricketts MD    Oncologist:     Seen and approved by:  Lavell Merlos MD  02/05/2018

## 2018-02-06 PROCEDURE — 77412 RADIATION TX DELIVERY LVL 3: CPT | Performed by: RADIOLOGY

## 2018-02-06 PROCEDURE — 77336 RADIATION PHYSICS CONSULT: CPT | Performed by: RADIOLOGY

## 2018-02-07 PROCEDURE — 77412 RADIATION TX DELIVERY LVL 3: CPT | Performed by: RADIOLOGY

## 2018-02-08 PROCEDURE — 77412 RADIATION TX DELIVERY LVL 3: CPT | Performed by: RADIOLOGY

## 2018-02-09 PROCEDURE — 77412 RADIATION TX DELIVERY LVL 3: CPT | Performed by: RADIOLOGY

## 2018-02-12 PROCEDURE — 77427 RADIATION TX MANAGEMENT X5: CPT | Performed by: RADIOLOGY

## 2018-02-12 PROCEDURE — 77412 RADIATION TX DELIVERY LVL 3: CPT | Performed by: RADIOLOGY

## 2018-02-13 ENCOUNTER — RADIATION ONCOLOGY WEEKLY ASSESSMENT (OUTPATIENT)
Dept: RADIATION ONCOLOGY | Facility: HOSPITAL | Age: 83
End: 2018-02-13

## 2018-02-13 VITALS
SYSTOLIC BLOOD PRESSURE: 144 MMHG | OXYGEN SATURATION: 99 % | HEART RATE: 79 BPM | DIASTOLIC BLOOD PRESSURE: 69 MMHG | RESPIRATION RATE: 16 BRPM | TEMPERATURE: 98 F

## 2018-02-13 DIAGNOSIS — C44.311 BASAL CELL CARCINOMA OF SKIN OF NOSE: Primary | ICD-10-CM

## 2018-02-13 PROCEDURE — 77412 RADIATION TX DELIVERY LVL 3: CPT | Performed by: RADIOLOGY

## 2018-02-13 PROCEDURE — 77336 RADIATION PHYSICS CONSULT: CPT | Performed by: RADIOLOGY

## 2018-02-13 NOTE — PROGRESS NOTES
Physician Weekly Management Note    Diagnosis:     Diagnosis Plan   1. Basal cell carcinoma of skin of nose         RT Details:   Treatment #32/37    Notes on Treatment course, Films, Medical progress:  Indication continues to do well only myeloma minor congestion no intranasal soreness no significant intranasal bleeding.  Skin demonstrates only moderate erythema without edema..  Continue as planned.  I will not need to see her back in our department after she completes.  She will follow-up with her dermatologist.    Weekly Management:  Medication reviewed?   Yes  New medications given?   No  Problemlist reviewed?   Yes  Problem added?   No      Technical aspects reviewed:  Weekly OBI approved?   Yes  Weekly port films approved?   Yes  Change requests noted on port film?   No  Patient setup and plan reviewed?   Yes    Chart Reviewed:  Continue current treatment plan?   Yes  Treatment plan change requested?   No  CBC reviewed?   No  Concurrent Chemo?   No    Objective     Toxicities:    As above     Review of Systems   As above    Vitals:    02/13/18 1128   BP: 144/69   Pulse: 79   Resp: 16   Temp: 98 °F (36.7 °C)   SpO2: 99%       No flowsheet data found.    Physical Exam  As above      Problem Summary List    Diagnosis:     Diagnosis Plan   1. Basal cell carcinoma of skin of nose       Pathology:       Past Medical History:   Diagnosis Date   • Anemia    • Anemia    • Arthritis    • Basal cell carcinoma of ala nasi 11/7/2017    BASAL CELL CANCER RT SIDE NOSE WITH 15 RADIATION TX'S  NO SURGERY DONE   • Cerebral hemorrhage 8/15/2014   • Closed fracture of proximal end of humerus 5/15/2015   • Depression    • Disease of thyroid gland    • Diverticulosis of intestine 3/10/2016   • GERD (gastroesophageal reflux disease)    • Hyperlipidemia    • Hypertension    • Irritable bowel syndrome    • Low back pain    • Osteopenia    • Renal insufficiency     STAGE 1V   • Seizures    • Stroke     HEMORRATIC STROKE JAN 2013   •  Visual impairment     magular degeneration         Past Surgical History:   Procedure Laterality Date   • BROW LIFT N/A 1/9/2018    Procedure:  MID FACE LIFT;  Surgeon: Sanya Rivero MD;  [place]: [facility] [place] [facility] [place];  Service:    • CHOLECYSTECTOMY     • COLONOSCOPY     • ECTROPION REPAIR Right 1/9/2018    Procedure: RIGHT LOWER LID ECTROPION REPAIR;  Surgeon: Sanya Rivero MD;  Location: [facility] [place] [facility] [place];  Service:    • EYE SURGERY      cataracts removed   • HYSTERECTOMY     • TONSILLECTOMY           Current Outpatient Prescriptions on File Prior to Visit   Medication Sig Dispense Refill   • acetaminophen (TYLENOL) 325 MG tablet Take 650 mg by mouth every 6 (six) hours.     • acetaminophen (TYLENOL) 325 MG tablet Take 2 tablets by mouth Every 4 (Four) Hours As Needed for Mild Pain . 1 bottle 0   • amLODIPine (NORVASC) 10 MG tablet Take 10 mg by mouth Every Evening.     • B Complex Vitamins (VITAMIN B COMPLEX) tablet Take 1 tablet by mouth Daily.     • Cranberry 1000 MG capsule Take 500 mg by mouth Every Morning.     • fluticasone (FLONASE) 50 MCG/ACT nasal spray 2 sprays into each nostril Daily. (Patient taking differently: 2 sprays into each nostril Every Morning.) 1 each 5   • furosemide (LASIX) 40 MG tablet Take 40 mg by mouth Every Morning.     • hyoscyamine (LEVBID) 0.375 MG 12 hr tablet Take 0.375 mg by mouth Every 12 (Twelve) Hours As Needed for Cramping.     • lansoprazole (PREVACID) 30 MG capsule Take 30 mg by mouth Every Morning.     • levETIRAcetam (KEPPRA) 250 MG tablet Take 250 mg by mouth 2 (Two) Times a Day. HEMORRATIC STROKE 2013   HAS BEEN ON MED SINCE THEN     • levothyroxine (SYNTHROID, LEVOTHROID) 25 MCG tablet Take 1 tablet by mouth Every Morning. 90 tablet 0   • mirtazapine (REMERON) 15 MG tablet Take 15 mg by mouth Every Night.     • Multiple Vitamins-Minerals (VITEYES AREDS ADVANCED) capsule Take 1 tablet by mouth Daily.     • olopatadine (PATANASE) 0.6 % solution nasal solution 2  sprays into each nostril 2 (Two) Times a Day. 1 bottle 5   • Probiotic Product (PROBIOTIC DAILY PO) Take 1 tablet by mouth Every Morning.     • simvastatin (ZOCOR) 20 MG tablet Take 20 mg by mouth Every Night.       No current facility-administered medications on file prior to visit.        Allergies   Allergen Reactions   • Sulfa Antibiotics Rash   • Azithromycin Diarrhea   • Risedronate Sodium      Other reaction(s): Other (See Comments)  ACHES   • Ciprofloxacin Hcl GI Intolerance   • Hydrocodone-Acetaminophen GI Intolerance         Primary care MD:    Mike Ricketts MD    Oncologist:    Seen and approved by:  Lavell Merlos MD  02/13/2018

## 2018-02-14 DIAGNOSIS — Z86.79: ICD-10-CM

## 2018-02-14 PROCEDURE — 77412 RADIATION TX DELIVERY LVL 3: CPT | Performed by: RADIOLOGY

## 2018-02-14 RX ORDER — LEVETIRACETAM 250 MG/1
TABLET ORAL
Qty: 180 TABLET | Refills: 0 | Status: SHIPPED | OUTPATIENT
Start: 2018-02-14 | End: 2018-06-20 | Stop reason: SDUPTHER

## 2018-02-15 DIAGNOSIS — E03.9 HYPOTHYROIDISM, UNSPECIFIED TYPE: ICD-10-CM

## 2018-02-15 PROCEDURE — 77412 RADIATION TX DELIVERY LVL 3: CPT | Performed by: RADIOLOGY

## 2018-02-15 RX ORDER — LEVOTHYROXINE SODIUM 0.03 MG/1
TABLET ORAL
Qty: 90 TABLET | Refills: 0 | Status: SHIPPED | OUTPATIENT
Start: 2018-02-15 | End: 2018-05-18 | Stop reason: SDUPTHER

## 2018-02-16 PROCEDURE — 77412 RADIATION TX DELIVERY LVL 3: CPT | Performed by: RADIOLOGY

## 2018-02-19 PROCEDURE — 77412 RADIATION TX DELIVERY LVL 3: CPT | Performed by: RADIOLOGY

## 2018-02-20 PROCEDURE — 77412 RADIATION TX DELIVERY LVL 3: CPT | Performed by: RADIOLOGY

## 2018-02-26 ENCOUNTER — DOCUMENTATION (OUTPATIENT)
Dept: RADIATION ONCOLOGY | Facility: HOSPITAL | Age: 83
End: 2018-02-26

## 2018-03-17 DIAGNOSIS — I10 ESSENTIAL HYPERTENSION: ICD-10-CM

## 2018-03-19 DIAGNOSIS — F32.A DEPRESSION, UNSPECIFIED DEPRESSION TYPE: ICD-10-CM

## 2018-03-19 RX ORDER — AMLODIPINE BESYLATE 10 MG/1
TABLET ORAL
Qty: 90 TABLET | Refills: 1 | Status: SHIPPED | OUTPATIENT
Start: 2018-03-19 | End: 2018-11-03 | Stop reason: SDUPTHER

## 2018-03-19 RX ORDER — MIRTAZAPINE 15 MG/1
TABLET, FILM COATED ORAL
Qty: 30 TABLET | Refills: 2 | Status: SHIPPED | OUTPATIENT
Start: 2018-03-19 | End: 2018-07-24 | Stop reason: SDUPTHER

## 2018-04-25 ENCOUNTER — OFFICE VISIT (OUTPATIENT)
Dept: INTERNAL MEDICINE | Age: 83
End: 2018-04-25

## 2018-04-25 VITALS
HEIGHT: 65 IN | OXYGEN SATURATION: 97 % | DIASTOLIC BLOOD PRESSURE: 68 MMHG | BODY MASS INDEX: 23.32 KG/M2 | HEART RATE: 68 BPM | TEMPERATURE: 98.8 F | SYSTOLIC BLOOD PRESSURE: 140 MMHG | WEIGHT: 140 LBS

## 2018-04-25 DIAGNOSIS — K21.9 GASTROESOPHAGEAL REFLUX DISEASE, ESOPHAGITIS PRESENCE NOT SPECIFIED: Chronic | ICD-10-CM

## 2018-04-25 DIAGNOSIS — K58.0 IRRITABLE BOWEL SYNDROME WITH DIARRHEA: Primary | Chronic | ICD-10-CM

## 2018-04-25 PROCEDURE — 99214 OFFICE O/P EST MOD 30 MIN: CPT | Performed by: INTERNAL MEDICINE

## 2018-04-25 RX ORDER — PANTOPRAZOLE SODIUM 40 MG/1
40 TABLET, DELAYED RELEASE ORAL DAILY
Qty: 30 TABLET | Refills: 3 | Status: SHIPPED | OUTPATIENT
Start: 2018-04-25 | End: 2018-08-28 | Stop reason: SDUPTHER

## 2018-04-25 NOTE — PROGRESS NOTES
[facility] [place]  HECTOR GUERRERO M.D.      Lianna B Fedde / 89 y.o. / female  04/25/2018      ASSESSMENT & PLAN:    Problem List Items Addressed This Visit        High    Irritable bowel syndrome with diarrhea - Primary (Chronic)    Current Assessment & Plan     Intermittent flareups. Check stool studies. Lactose free diet. Keep diary.          Relevant Orders    Clostridium Difficile EIA - Stool, Per Rectum    Fecal Leukocytes - Stool, Per Rectum       Medium    Gastroesophageal reflux disease (Chronic)    Current Assessment & Plan     Uncontrolled on Prevacid. Start pantoprazole 40 mg qd. Educational handout given on GERD.          Relevant Medications    hyoscyamine (LEVBID) 0.375 MG 12 hr tablet    pantoprazole (PROTONIX) 40 MG EC tablet      Other Visit Diagnoses    None.       Orders Placed This Encounter   Procedures   • Clostridium Difficile EIA - Stool, Per Rectum   • Fecal Leukocytes - Stool, Per Rectum       Summary/Discussion:      Return in about 1 month (around 5/25/2018) for Reassess today's problem(s).    ____________________________________________________________________    MEDICATIONS  Current Outpatient Prescriptions   Medication Sig Dispense Refill   • acetaminophen (TYLENOL) 325 MG tablet Take 650 mg by mouth every 6 (six) hours.     • amLODIPine (NORVASC) 10 MG tablet TAKE 1 TABLET NIGHTLY 90 tablet 1   • B Complex Vitamins (VITAMIN B COMPLEX) tablet Take 1 tablet by mouth Daily.     • Cranberry 1000 MG capsule Take 500 mg by mouth Every Morning.     • fluticasone (FLONASE) 50 MCG/ACT nasal spray 2 sprays into each nostril Daily. (Patient taking differently: 2 sprays into each nostril Every Morning.) 1 each 5   • furosemide (LASIX) 40 MG tablet Take 40 mg by mouth Every Morning.     • hyoscyamine (LEVBID) 0.375 MG 12 hr tablet Take 0.375 mg by mouth Every 12 (Twelve) Hours As Needed for Cramping.     • lansoprazole (PREVACID) 30 MG capsule Take 30 mg by mouth Every Morning.     • levETIRAcetam  "(KEPPRA) 250 MG tablet TAKE 1 TABLET TWICE DAILY. 180 tablet 0   • levothyroxine (SYNTHROID, LEVOTHROID) 25 MCG tablet TAKE 1 TABLET BY MOUTH EVERY MORNING. 90 tablet 0   • mirtazapine (REMERON) 15 MG tablet TAKE 1 TABLET BY MOUTH EVERY DAY IN THE EVENING 30 tablet 2   • Multiple Vitamins-Minerals (VITEYES AREDS ADVANCED) capsule Take 1 tablet by mouth Daily.     • olopatadine (PATANASE) 0.6 % solution nasal solution 2 sprays into each nostril 2 (Two) Times a Day. 1 bottle 5   • Probiotic Product (PROBIOTIC DAILY PO) Take 1 tablet by mouth Every Morning.     • simvastatin (ZOCOR) 20 MG tablet Take 20 mg by mouth Every Night.       No current facility-administered medications for this visit.          VITALS:    Visit Vitals  /68   Pulse 68   Temp 98.8 °F (37.1 °C)   Ht 165.1 cm (65\")   Wt 63.5 kg (140 lb)   SpO2 97%   BMI 23.30 kg/m²       BP Readings from Last 3 Encounters:   04/25/18 140/68   02/13/18 144/69   02/05/18 130/64     Wt Readings from Last 3 Encounters:   04/25/18 63.5 kg (140 lb)   01/09/18 65.1 kg (143 lb 8 oz)   01/05/18 65.3 kg (144 lb)      Body mass index is 23.3 kg/m².    CC:  Main reason(s) for today's visit: Hypertension and Diarrhea (x 3 weeks different bouts)      HPI:     Patient complains of intermittent bouts of watery diarrhea without any blood, fever, abdominal pain.  She has history of chronic irritable bowel syndrome with diarrhea as well as history of possible lactose intolerance.  Denies significant loss of appetite, abnormal weight loss.  Denies recent use of antibiotics.  She had a similar bout in September 2017 and stool studies were negative for infection.  Complains of worsening reflux on Prevacid 15 mg 2 daily.    Patient Care Team:  Mike Ricketts MD as PCP - General  Ester Villegas DPM as PCP - Claims Attributed  Brice Zambrano MD as Consulting Physician (Dermatology)  Chad Marino MD as Consulting Physician (Ophthalmology)  Jl Layton MD as " Consulting Physician (Nephrology)    ____________________________________________________________________    REVIEW OF SYSTEMS    Review of Systems  Constitutional neg  Resp neg  CV neg  Psych neg      PHYSICAL EXAMINATION    Physical Exam  Constitutional  No distress  Abdomen: Soft and nontender, no palpable mass, no hepatomegaly.   Psychiatric  Alert. Judgment and thought content normal. Mood normal    REVIEWED DATA:    Labs:     Lab Results   Component Value Date     (H) 01/05/2018    K 5.0 01/05/2018    AST 24 04/25/2016    ALT 12 04/25/2016    BUN 60 (H) 01/05/2018    CREATININE 2.42 (H) 01/05/2018    CREATININE 1.94 (H) 05/10/2016    CREATININE 1.86 (H) 04/25/2016    EGFRIFNONA 19 (L) 01/05/2018    EGFRIFAFRI 26 (L) 05/10/2016       Lab Results   Component Value Date    GLUCOSE 89 01/05/2018       Lab Results   Component Value Date    LDL 71 11/30/2017    LDL 92 04/25/2016    HDL 51 11/30/2017    HDL 58 04/25/2016    TRIG 156 (H) 11/30/2017    TRIG 130 04/25/2016    CHOLHDLRATIO 3.00 11/30/2017    CHOLHDLRATIO 3.0 04/25/2016       Lab Results   Component Value Date    TSH 3.560 11/30/2017    FREET4 1.21 11/30/2017       Lab Results   Component Value Date    WBC 7.29 01/05/2018    HGB 11.4 (L) 01/05/2018    HGB 12.0 05/05/2015     01/05/2018       Imaging:         Medical Tests:         Summary of old records / correspondence / consultant report:         Request outside records:         ALLERGIES  Allergies   Allergen Reactions   • Sulfa Antibiotics Rash   • Azithromycin Diarrhea   • Risedronate Sodium      Other reaction(s): Other (See Comments)  ACHES   • Ciprofloxacin Hcl GI Intolerance   • Hydrocodone-Acetaminophen GI Intolerance        PFSH:     The following portions of the patient's history were reviewed and updated as appropriate: Allergies / Current Medications / Past Medical History / Surgical History / Social History / Family History    PROBLEM LIST   Patient Active Problem List    Diagnosis   • Atopic rhinitis   • Anemia   • Stenosis of carotid artery   • Chronic kidney disease   • Depression   • Gastroesophageal reflux disease   • Hypertension   • Hyperlipidemia   • Hypothyroidism   • Irritable bowel syndrome with diarrhea   • Restless legs syndrome   • Vitamin D deficiency   • Basal cell carcinoma of skin of nose       PAST MEDICAL HISTORY  Past Medical History:   Diagnosis Date   • Anemia    • Anemia    • Arthritis    • Basal cell carcinoma of ala nasi 11/7/2017    BASAL CELL CANCER RT SIDE NOSE WITH 15 RADIATION TX'S  NO SURGERY DONE   • Cerebral hemorrhage 8/15/2014   • Closed fracture of proximal end of humerus 5/15/2015   • Depression    • Disease of thyroid gland    • Diverticulosis of intestine 3/10/2016   • GERD (gastroesophageal reflux disease)    • Hyperlipidemia    • Hypertension    • Irritable bowel syndrome    • Low back pain    • Osteopenia    • Renal insufficiency     STAGE 1V   • Seizures    • Stroke     HEMORRATIC STROKE JAN 2013   • Visual impairment     magular degeneration       SURGICAL HISTORY  Past Surgical History:   Procedure Laterality Date   • BROW LIFT N/A 1/9/2018    Procedure:  MID FACE LIFT;  Surgeon: Sanya Rivero MD;  Location: [facility];  Service:    • CHOLECYSTECTOMY     • COLONOSCOPY     • ECTROPION REPAIR Right 1/9/2018    Procedure: RIGHT LOWER LID ECTROPION REPAIR;  Surgeon: Sanya Rivero MD;  Location: [facility];  Service:    • EYE SURGERY      cataracts removed   • HYSTERECTOMY     • TONSILLECTOMY         SOCIAL HISTORY  Social History     Social History   • Marital status:    • Number of children: 3     Occupational History   • Retired      [employer]     Social History Main Topics   • Smoking status: Never Smoker   • Smokeless tobacco: Never Used   • Alcohol use No   • Drug use: No     Other Topics Concern   • Not on file     Social History Narrative    [place]       FAMILY  HISTORY  Family History   Problem Relation Age of Onset   • Diabetes Brother    • Heart disease Mother    • Breast cancer Mother    • Kidney disease Other    • Breast cancer Other    • Diabetes Other    • Hypertension Other    • Malig Hyperthermia Neg Hx          **Dragon Disclaimer:   Much of this encounter note is an electronic transcription/translation of spoken language to printed text. The electronic translation of spoken language may permit erroneous, or at times, nonsensical words or phrases to be inadvertently transcribed. Although I have reviewed the note for such errors, some may still exist.

## 2018-04-30 DIAGNOSIS — K58.0 IRRITABLE BOWEL SYNDROME WITH DIARRHEA: ICD-10-CM

## 2018-04-30 RX ORDER — HYOSCYAMINE SULFATE EXTENDED-RELEASE 0.38 MG/1
TABLET ORAL
Qty: 180 TABLET | Refills: 1 | Status: SHIPPED | OUTPATIENT
Start: 2018-04-30 | End: 2018-01-01 | Stop reason: SDUPTHER

## 2018-05-03 LAB
C DIFF TOX A+B STL QL IA: NEGATIVE
WBC STL QL MICRO: NORMAL
WBC STL QL MICRO: NORMAL

## 2018-05-06 DIAGNOSIS — E78.5 HYPERLIPIDEMIA, UNSPECIFIED HYPERLIPIDEMIA TYPE: ICD-10-CM

## 2018-05-07 RX ORDER — SIMVASTATIN 20 MG
TABLET ORAL
Qty: 90 TABLET | Refills: 1 | Status: SHIPPED | OUTPATIENT
Start: 2018-05-07 | End: 2018-01-01 | Stop reason: SDUPTHER

## 2018-05-18 DIAGNOSIS — E03.9 HYPOTHYROIDISM, UNSPECIFIED TYPE: ICD-10-CM

## 2018-05-18 RX ORDER — LEVOTHYROXINE SODIUM 0.03 MG/1
TABLET ORAL
Qty: 90 TABLET | Refills: 0 | Status: SHIPPED | OUTPATIENT
Start: 2018-05-18 | End: 2018-09-26 | Stop reason: SDUPTHER

## 2018-06-12 DIAGNOSIS — J30.9 ATOPIC RHINITIS: Chronic | ICD-10-CM

## 2018-06-13 RX ORDER — OLOPATADINE HYDROCHLORIDE 665 UG/1
SPRAY NASAL
Qty: 1 BOTTLE | Refills: 1 | Status: SHIPPED | OUTPATIENT
Start: 2018-06-13 | End: 2019-01-01 | Stop reason: CLARIF

## 2018-06-20 DIAGNOSIS — Z86.79: ICD-10-CM

## 2018-06-21 RX ORDER — LEVETIRACETAM 250 MG/1
TABLET ORAL
Qty: 180 TABLET | Refills: 0 | Status: SHIPPED | OUTPATIENT
Start: 2018-06-21 | End: 2018-10-02 | Stop reason: SDUPTHER

## 2018-07-24 DIAGNOSIS — F32.A DEPRESSION, UNSPECIFIED DEPRESSION TYPE: ICD-10-CM

## 2018-07-25 RX ORDER — MIRTAZAPINE 15 MG/1
TABLET, FILM COATED ORAL
Qty: 30 TABLET | Refills: 2 | Status: SHIPPED | OUTPATIENT
Start: 2018-07-25 | End: 2018-11-02 | Stop reason: SDUPTHER

## 2018-08-28 DIAGNOSIS — K21.9 GASTROESOPHAGEAL REFLUX DISEASE, ESOPHAGITIS PRESENCE NOT SPECIFIED: Chronic | ICD-10-CM

## 2018-08-28 RX ORDER — PANTOPRAZOLE SODIUM 40 MG/1
40 TABLET, DELAYED RELEASE ORAL DAILY
Qty: 30 TABLET | Refills: 5 | Status: SHIPPED | OUTPATIENT
Start: 2018-08-28 | End: 2019-01-01 | Stop reason: SDUPTHER

## 2018-09-26 DIAGNOSIS — E03.9 HYPOTHYROIDISM, UNSPECIFIED TYPE: ICD-10-CM

## 2018-09-26 RX ORDER — LEVOTHYROXINE SODIUM 0.03 MG/1
TABLET ORAL
Qty: 90 TABLET | Refills: 0 | Status: SHIPPED | OUTPATIENT
Start: 2018-09-26 | End: 2019-01-01 | Stop reason: SDUPTHER

## 2018-10-02 DIAGNOSIS — Z86.79: ICD-10-CM

## 2018-10-02 RX ORDER — LEVETIRACETAM 250 MG/1
TABLET ORAL
Qty: 180 TABLET | Refills: 1 | Status: SHIPPED | OUTPATIENT
Start: 2018-10-02 | End: 2019-01-01 | Stop reason: SDUPTHER

## 2018-10-12 DIAGNOSIS — J30.9 ATOPIC RHINITIS: Chronic | ICD-10-CM

## 2018-10-15 RX ORDER — FLUTICASONE PROPIONATE 50 MCG
SPRAY, SUSPENSION (ML) NASAL
Qty: 16 ML | Refills: 0 | Status: SHIPPED | OUTPATIENT
Start: 2018-10-15 | End: 2018-01-01 | Stop reason: SDUPTHER

## 2018-10-17 DIAGNOSIS — J30.9 ATOPIC RHINITIS: Chronic | ICD-10-CM

## 2018-10-17 RX ORDER — OLOPATADINE HYDROCHLORIDE 665 UG/1
SPRAY NASAL
Qty: 30.5 G | Refills: 2 | Status: SHIPPED | OUTPATIENT
Start: 2018-10-17 | End: 2019-01-01 | Stop reason: CLARIF

## 2018-11-02 DIAGNOSIS — F32.A DEPRESSION, UNSPECIFIED DEPRESSION TYPE: ICD-10-CM

## 2018-11-02 RX ORDER — MIRTAZAPINE 15 MG/1
TABLET, FILM COATED ORAL
Qty: 30 TABLET | Refills: 2 | Status: SHIPPED | OUTPATIENT
Start: 2018-11-02 | End: 2019-01-01 | Stop reason: SDUPTHER

## 2018-11-03 DIAGNOSIS — I10 ESSENTIAL HYPERTENSION: ICD-10-CM

## 2018-11-05 RX ORDER — AMLODIPINE BESYLATE 10 MG/1
TABLET ORAL
Qty: 90 TABLET | Refills: 3 | Status: SHIPPED | OUTPATIENT
Start: 2018-11-05

## 2019-01-01 ENCOUNTER — APPOINTMENT (OUTPATIENT)
Dept: CT IMAGING | Facility: HOSPITAL | Age: 84
End: 2019-01-01

## 2019-01-01 ENCOUNTER — PATIENT OUTREACH (OUTPATIENT)
Dept: CASE MANAGEMENT | Facility: OTHER | Age: 84
End: 2019-01-01

## 2019-01-01 ENCOUNTER — TELEPHONE (OUTPATIENT)
Dept: INTERNAL MEDICINE | Age: 84
End: 2019-01-01

## 2019-01-01 ENCOUNTER — HOSPITAL ENCOUNTER (EMERGENCY)
Facility: HOSPITAL | Age: 84
Discharge: HOME | End: 2019-06-20
Attending: EMERGENCY MEDICINE | Admitting: EMERGENCY MEDICINE

## 2019-01-01 ENCOUNTER — APPOINTMENT (OUTPATIENT)
Dept: GENERAL RADIOLOGY | Facility: HOSPITAL | Age: 84
End: 2019-01-01

## 2019-01-01 ENCOUNTER — OFFICE VISIT (OUTPATIENT)
Dept: INTERNAL MEDICINE | Age: 84
End: 2019-01-01

## 2019-01-01 ENCOUNTER — EPISODE CHANGES (OUTPATIENT)
Dept: CASE MANAGEMENT | Facility: OTHER | Age: 84
End: 2019-01-01

## 2019-01-01 ENCOUNTER — HOSPITAL ENCOUNTER (EMERGENCY)
Facility: HOSPITAL | Age: 84
Discharge: HOME | End: 2019-08-24
Attending: EMERGENCY MEDICINE | Admitting: EMERGENCY MEDICINE

## 2019-01-01 VITALS
DIASTOLIC BLOOD PRESSURE: 62 MMHG | BODY MASS INDEX: 22.16 KG/M2 | TEMPERATURE: 98.3 F | HEIGHT: 65 IN | HEART RATE: 98 BPM | WEIGHT: 133 LBS | SYSTOLIC BLOOD PRESSURE: 114 MMHG | OXYGEN SATURATION: 95 %

## 2019-01-01 VITALS
DIASTOLIC BLOOD PRESSURE: 75 MMHG | BODY MASS INDEX: 23.97 KG/M2 | RESPIRATION RATE: 18 BRPM | HEIGHT: 64 IN | WEIGHT: 140.4 LBS | HEART RATE: 73 BPM | OXYGEN SATURATION: 95 % | SYSTOLIC BLOOD PRESSURE: 155 MMHG | TEMPERATURE: 97.9 F

## 2019-01-01 VITALS
OXYGEN SATURATION: 94 % | WEIGHT: 138 LBS | DIASTOLIC BLOOD PRESSURE: 79 MMHG | HEIGHT: 65 IN | BODY MASS INDEX: 22.99 KG/M2 | HEART RATE: 78 BPM | RESPIRATION RATE: 16 BRPM | SYSTOLIC BLOOD PRESSURE: 147 MMHG | TEMPERATURE: 97.8 F

## 2019-01-01 DIAGNOSIS — R33.8 ACUTE URINARY RETENTION: Primary | ICD-10-CM

## 2019-01-01 DIAGNOSIS — K58.0 IRRITABLE BOWEL SYNDROME WITH DIARRHEA: ICD-10-CM

## 2019-01-01 DIAGNOSIS — E03.9 HYPOTHYROIDISM, UNSPECIFIED TYPE: ICD-10-CM

## 2019-01-01 DIAGNOSIS — K21.9 GASTROESOPHAGEAL REFLUX DISEASE, ESOPHAGITIS PRESENCE NOT SPECIFIED: Chronic | ICD-10-CM

## 2019-01-01 DIAGNOSIS — R41.0 ACUTE CONFUSION: Primary | ICD-10-CM

## 2019-01-01 DIAGNOSIS — F32.A DEPRESSION, UNSPECIFIED DEPRESSION TYPE: Chronic | ICD-10-CM

## 2019-01-01 DIAGNOSIS — J30.9 ALLERGIC RHINITIS, UNSPECIFIED SEASONALITY, UNSPECIFIED TRIGGER: Primary | ICD-10-CM

## 2019-01-01 DIAGNOSIS — Z86.79: ICD-10-CM

## 2019-01-01 DIAGNOSIS — H91.90 DECREASED HEARING, UNSPECIFIED LATERALITY: Primary | ICD-10-CM

## 2019-01-01 DIAGNOSIS — R07.81 RIB PAIN ON LEFT SIDE: ICD-10-CM

## 2019-01-01 DIAGNOSIS — F32.A DEPRESSION, UNSPECIFIED DEPRESSION TYPE: ICD-10-CM

## 2019-01-01 DIAGNOSIS — R10.12 LUQ ABDOMINAL PAIN: ICD-10-CM

## 2019-01-01 DIAGNOSIS — Z00.00 MEDICARE ANNUAL WELLNESS VISIT, SUBSEQUENT: Primary | ICD-10-CM

## 2019-01-01 DIAGNOSIS — E78.2 MIXED HYPERLIPIDEMIA: Chronic | ICD-10-CM

## 2019-01-01 DIAGNOSIS — E03.9 HYPOTHYROIDISM, UNSPECIFIED TYPE: Chronic | ICD-10-CM

## 2019-01-01 DIAGNOSIS — N18.4 STAGE 4 CHRONIC KIDNEY DISEASE (HCC): Chronic | ICD-10-CM

## 2019-01-01 DIAGNOSIS — I10 ESSENTIAL HYPERTENSION: Chronic | ICD-10-CM

## 2019-01-01 LAB
ALBUMIN SERPL-MCNC: 4.3 G/DL (ref 3.5–5.2)
ALBUMIN SERPL-MCNC: 4.3 G/DL (ref 3.5–5.2)
ALBUMIN SERPL-MCNC: 4.7 G/DL (ref 3.2–4.6)
ALBUMIN/GLOB SERPL: 1.3 G/DL
ALBUMIN/GLOB SERPL: 1.7 G/DL
ALBUMIN/GLOB SERPL: 1.7 {RATIO} (ref 1.2–2.2)
ALP SERPL-CCNC: 63 U/L (ref 39–117)
ALP SERPL-CCNC: 74 IU/L (ref 39–117)
ALP SERPL-CCNC: 90 U/L (ref 39–117)
ALT SERPL W P-5'-P-CCNC: 13 U/L (ref 1–33)
ALT SERPL W P-5'-P-CCNC: 8 U/L (ref 1–33)
ALT SERPL-CCNC: 14 IU/L (ref 0–32)
ANION GAP SERPL CALCULATED.3IONS-SCNC: 15 MMOL/L (ref 5–15)
ANION GAP SERPL CALCULATED.3IONS-SCNC: 17.2 MMOL/L
AST SERPL-CCNC: 20 U/L (ref 1–32)
AST SERPL-CCNC: 22 U/L (ref 1–32)
AST SERPL-CCNC: 26 IU/L (ref 0–40)
BACTERIA UR QL AUTO: ABNORMAL /HPF
BASOPHILS # BLD AUTO: 0.04 10*3/MM3 (ref 0–0.2)
BASOPHILS # BLD AUTO: 0.05 10*3/MM3 (ref 0–0.2)
BASOPHILS NFR BLD AUTO: 0.4 % (ref 0–1.5)
BASOPHILS NFR BLD AUTO: 0.7 % (ref 0–1.5)
BILIRUB SERPL-MCNC: 0.3 MG/DL (ref 0.2–1.2)
BILIRUB SERPL-MCNC: 0.5 MG/DL (ref 0–1.2)
BILIRUB SERPL-MCNC: 0.6 MG/DL (ref 0.2–1.2)
BILIRUB UR QL STRIP: NEGATIVE
BILIRUB UR QL STRIP: NEGATIVE
BUN BLD-MCNC: 51 MG/DL (ref 8–23)
BUN BLD-MCNC: 63 MG/DL (ref 8–23)
BUN SERPL-MCNC: 55 MG/DL (ref 10–36)
BUN/CREAT SERPL: 18 (ref 12–28)
BUN/CREAT SERPL: 18.8 (ref 7–25)
BUN/CREAT SERPL: 24.1 (ref 7–25)
CALCIUM SERPL-MCNC: 10.4 MG/DL (ref 8.7–10.3)
CALCIUM SPEC-SCNC: 9 MG/DL (ref 8.2–9.6)
CALCIUM SPEC-SCNC: 9.7 MG/DL (ref 8.2–9.6)
CHLORIDE SERPL-SCNC: 103 MMOL/L (ref 98–107)
CHLORIDE SERPL-SCNC: 105 MMOL/L (ref 96–106)
CHLORIDE SERPL-SCNC: 98 MMOL/L (ref 98–107)
CHOLEST SERPL-MCNC: 172 MG/DL (ref 100–199)
CHOLEST/HDLC SERPL: 3.4 RATIO (ref 0–4.4)
CLARITY UR: CLEAR
CLARITY UR: CLEAR
CO2 SERPL-SCNC: 23 MMOL/L (ref 20–29)
CO2 SERPL-SCNC: 25.8 MMOL/L (ref 22–29)
CO2 SERPL-SCNC: 27 MMOL/L (ref 22–29)
COLOR UR: YELLOW
COLOR UR: YELLOW
CREAT BLD-MCNC: 2.61 MG/DL (ref 0.57–1)
CREAT BLD-MCNC: 2.72 MG/DL (ref 0.57–1)
CREAT SERPL-MCNC: 3.02 MG/DL (ref 0.57–1)
DEPRECATED RDW RBC AUTO: 45.1 FL (ref 37–54)
DEPRECATED RDW RBC AUTO: 45.8 FL (ref 37–54)
EOSINOPHIL # BLD AUTO: 0.24 10*3/MM3 (ref 0–0.4)
EOSINOPHIL # BLD AUTO: 0.4 10*3/MM3 (ref 0–0.4)
EOSINOPHIL NFR BLD AUTO: 2.3 % (ref 0.3–6.2)
EOSINOPHIL NFR BLD AUTO: 5.4 % (ref 0.3–6.2)
ERYTHROCYTE [DISTWIDTH] IN BLOOD BY AUTOMATED COUNT: 12.5 % (ref 12.3–15.4)
ERYTHROCYTE [DISTWIDTH] IN BLOOD BY AUTOMATED COUNT: 12.8 % (ref 12.3–15.4)
GFR SERPL CREATININE-BSD FRML MDRD: 16 ML/MIN/1.73
GFR SERPL CREATININE-BSD FRML MDRD: 17 ML/MIN/1.73
GLOBULIN SER CALC-MCNC: 2.8 G/DL (ref 1.5–4.5)
GLOBULIN UR ELPH-MCNC: 2.6 GM/DL
GLOBULIN UR ELPH-MCNC: 3.4 GM/DL
GLUCOSE BLD-MCNC: 100 MG/DL (ref 65–99)
GLUCOSE BLD-MCNC: 89 MG/DL (ref 65–99)
GLUCOSE SERPL-MCNC: 86 MG/DL (ref 65–99)
GLUCOSE UR STRIP-MCNC: NEGATIVE MG/DL
GLUCOSE UR STRIP-MCNC: NEGATIVE MG/DL
HCT VFR BLD AUTO: 35.1 % (ref 34–46.6)
HCT VFR BLD AUTO: 35.2 % (ref 34–46.6)
HDLC SERPL-MCNC: 50 MG/DL
HGB BLD-MCNC: 11.2 G/DL (ref 12–15.9)
HGB BLD-MCNC: 11.3 G/DL (ref 12–15.9)
HGB UR QL STRIP.AUTO: ABNORMAL
HGB UR QL STRIP.AUTO: NEGATIVE
HOLD SPECIMEN: NORMAL
HYALINE CASTS UR QL AUTO: ABNORMAL /LPF
IMM GRANULOCYTES # BLD AUTO: 0.03 10*3/MM3 (ref 0–0.05)
IMM GRANULOCYTES # BLD AUTO: 0.03 10*3/MM3 (ref 0–0.05)
IMM GRANULOCYTES NFR BLD AUTO: 0.3 % (ref 0–0.5)
IMM GRANULOCYTES NFR BLD AUTO: 0.4 % (ref 0–0.5)
KETONES UR QL STRIP: NEGATIVE
KETONES UR QL STRIP: NEGATIVE
LDLC SERPL CALC-MCNC: 87 MG/DL (ref 0–99)
LEUKOCYTE ESTERASE UR QL STRIP.AUTO: ABNORMAL
LEUKOCYTE ESTERASE UR QL STRIP.AUTO: NEGATIVE
LIPASE SERPL-CCNC: 32 U/L (ref 13–60)
LYMPHOCYTES # BLD AUTO: 1.32 10*3/MM3 (ref 0.7–3.1)
LYMPHOCYTES # BLD AUTO: 1.41 10*3/MM3 (ref 0.7–3.1)
LYMPHOCYTES NFR BLD AUTO: 13.4 % (ref 19.6–45.3)
LYMPHOCYTES NFR BLD AUTO: 18 % (ref 19.6–45.3)
MCH RBC QN AUTO: 31.4 PG (ref 26.6–33)
MCH RBC QN AUTO: 32.1 PG (ref 26.6–33)
MCHC RBC AUTO-ENTMCNC: 31.9 G/DL (ref 31.5–35.7)
MCHC RBC AUTO-ENTMCNC: 32.1 G/DL (ref 31.5–35.7)
MCV RBC AUTO: 100 FL (ref 79–97)
MCV RBC AUTO: 98.3 FL (ref 79–97)
MONOCYTES # BLD AUTO: 0.7 10*3/MM3 (ref 0.1–0.9)
MONOCYTES # BLD AUTO: 1.09 10*3/MM3 (ref 0.1–0.9)
MONOCYTES NFR BLD AUTO: 10.3 % (ref 5–12)
MONOCYTES NFR BLD AUTO: 9.5 % (ref 5–12)
NEUTROPHILS # BLD AUTO: 4.85 10*3/MM3 (ref 1.7–7)
NEUTROPHILS # BLD AUTO: 7.75 10*3/MM3 (ref 1.7–7)
NEUTROPHILS NFR BLD AUTO: 66 % (ref 42.7–76)
NEUTROPHILS NFR BLD AUTO: 73.3 % (ref 42.7–76)
NITRITE UR QL STRIP: NEGATIVE
NITRITE UR QL STRIP: NEGATIVE
NRBC BLD AUTO-RTO: 0 /100 WBC (ref 0–0.2)
NRBC BLD AUTO-RTO: 0 /100 WBC (ref 0–0.2)
PH UR STRIP.AUTO: 5.5 [PH] (ref 5–8)
PH UR STRIP.AUTO: 7 [PH] (ref 5–8)
PLATELET # BLD AUTO: 178 10*3/MM3 (ref 140–450)
PLATELET # BLD AUTO: 215 10*3/MM3 (ref 140–450)
PMV BLD AUTO: 10.6 FL (ref 6–12)
PMV BLD AUTO: 11.5 FL (ref 6–12)
POTASSIUM BLD-SCNC: 4 MMOL/L (ref 3.5–5.2)
POTASSIUM BLD-SCNC: 4.5 MMOL/L (ref 3.5–5.2)
POTASSIUM SERPL-SCNC: 4.7 MMOL/L (ref 3.5–5.2)
PROT SERPL-MCNC: 6.9 G/DL (ref 6–8.5)
PROT SERPL-MCNC: 7.5 G/DL (ref 6–8.5)
PROT SERPL-MCNC: 7.7 G/DL (ref 6–8.5)
PROT UR QL STRIP: NEGATIVE
PROT UR QL STRIP: NEGATIVE
RBC # BLD AUTO: 3.52 10*6/MM3 (ref 3.77–5.28)
RBC # BLD AUTO: 3.57 10*6/MM3 (ref 3.77–5.28)
RBC # UR: ABNORMAL /HPF
REF LAB TEST METHOD: ABNORMAL
SODIUM BLD-SCNC: 141 MMOL/L (ref 136–145)
SODIUM BLD-SCNC: 145 MMOL/L (ref 136–145)
SODIUM SERPL-SCNC: 148 MMOL/L (ref 134–144)
SP GR UR STRIP: 1.01 (ref 1–1.03)
SP GR UR STRIP: 1.01 (ref 1–1.03)
SQUAMOUS #/AREA URNS HPF: ABNORMAL /HPF
T4 FREE SERPL-MCNC: 1.59 NG/DL (ref 0.82–1.77)
TRIGL SERPL-MCNC: 176 MG/DL (ref 0–149)
TSH SERPL DL<=0.005 MIU/L-ACNC: 2.75 UIU/ML (ref 0.45–4.5)
UROBILINOGEN UR QL STRIP: ABNORMAL
UROBILINOGEN UR QL STRIP: NORMAL
VLDLC SERPL CALC-MCNC: 35 MG/DL (ref 5–40)
WBC NRBC COR # BLD: 10.56 10*3/MM3 (ref 3.4–10.8)
WBC NRBC COR # BLD: 7.35 10*3/MM3 (ref 3.4–10.8)
WBC UR QL AUTO: ABNORMAL /HPF
WHOLE BLOOD HOLD SPECIMEN: NORMAL

## 2019-01-01 PROCEDURE — 71101 X-RAY EXAM UNILAT RIBS/CHEST: CPT

## 2019-01-01 PROCEDURE — 99213 OFFICE O/P EST LOW 20 MIN: CPT | Performed by: INTERNAL MEDICINE

## 2019-01-01 PROCEDURE — 99285 EMERGENCY DEPT VISIT HI MDM: CPT

## 2019-01-01 PROCEDURE — 81003 URINALYSIS AUTO W/O SCOPE: CPT | Performed by: EMERGENCY MEDICINE

## 2019-01-01 PROCEDURE — 85025 COMPLETE CBC W/AUTO DIFF WBC: CPT | Performed by: EMERGENCY MEDICINE

## 2019-01-01 PROCEDURE — 81001 URINALYSIS AUTO W/SCOPE: CPT | Performed by: PHYSICIAN ASSISTANT

## 2019-01-01 PROCEDURE — 99284 EMERGENCY DEPT VISIT MOD MDM: CPT

## 2019-01-01 PROCEDURE — P9612 CATHETERIZE FOR URINE SPEC: HCPCS

## 2019-01-01 PROCEDURE — 80053 COMPREHEN METABOLIC PANEL: CPT | Performed by: PHYSICIAN ASSISTANT

## 2019-01-01 PROCEDURE — 80053 COMPREHEN METABOLIC PANEL: CPT | Performed by: EMERGENCY MEDICINE

## 2019-01-01 PROCEDURE — 83690 ASSAY OF LIPASE: CPT | Performed by: PHYSICIAN ASSISTANT

## 2019-01-01 PROCEDURE — G0439 PPPS, SUBSEQ VISIT: HCPCS | Performed by: INTERNAL MEDICINE

## 2019-01-01 PROCEDURE — 85025 COMPLETE CBC W/AUTO DIFF WBC: CPT | Performed by: PHYSICIAN ASSISTANT

## 2019-01-01 PROCEDURE — 70450 CT HEAD/BRAIN W/O DYE: CPT

## 2019-01-01 PROCEDURE — 74176 CT ABD & PELVIS W/O CONTRAST: CPT

## 2019-01-01 PROCEDURE — 51702 INSERT TEMP BLADDER CATH: CPT

## 2019-01-01 PROCEDURE — 99283 EMERGENCY DEPT VISIT LOW MDM: CPT

## 2019-01-01 RX ORDER — LEVOTHYROXINE SODIUM 0.03 MG/1
25 TABLET ORAL EVERY MORNING
Qty: 30 TABLET | Refills: 0 | Status: SHIPPED | OUTPATIENT
Start: 2019-01-01 | End: 2019-01-01 | Stop reason: SDUPTHER

## 2019-01-01 RX ORDER — LEVOTHYROXINE SODIUM 0.03 MG/1
TABLET ORAL
Qty: 30 TABLET | Refills: 0 | Status: SHIPPED | OUTPATIENT
Start: 2019-01-01 | End: 2019-01-01 | Stop reason: SDUPTHER

## 2019-01-01 RX ORDER — OLOPATADINE HYDROCHLORIDE 665 UG/1
SPRAY NASAL 2 TIMES DAILY
COMMUNITY

## 2019-01-01 RX ORDER — MIRTAZAPINE 15 MG/1
TABLET, FILM COATED ORAL
Qty: 30 TABLET | Refills: 2 | Status: SHIPPED | OUTPATIENT
Start: 2019-01-01 | End: 2019-01-01 | Stop reason: SDUPTHER

## 2019-01-01 RX ORDER — LEVETIRACETAM 250 MG/1
TABLET ORAL
Qty: 180 TABLET | Refills: 2 | Status: SHIPPED | OUTPATIENT
Start: 2019-01-01

## 2019-01-01 RX ORDER — MIRTAZAPINE 15 MG/1
TABLET, FILM COATED ORAL
Qty: 30 TABLET | Refills: 2 | Status: SHIPPED | OUTPATIENT
Start: 2019-01-01

## 2019-01-01 RX ORDER — HYOSCYAMINE SULFATE EXTENDED-RELEASE 0.38 MG/1
TABLET ORAL
Qty: 180 TABLET | Refills: 1 | Status: SHIPPED | OUTPATIENT
Start: 2019-01-01

## 2019-01-01 RX ORDER — LEVOTHYROXINE SODIUM 0.03 MG/1
TABLET ORAL
Qty: 30 TABLET | Refills: 0 | Status: SHIPPED | OUTPATIENT
Start: 2019-01-01

## 2019-01-01 RX ORDER — LEVOTHYROXINE SODIUM 0.03 MG/1
TABLET ORAL
Qty: 90 TABLET | Refills: 0 | Status: SHIPPED | OUTPATIENT
Start: 2019-01-01 | End: 2019-01-01 | Stop reason: SDUPTHER

## 2019-01-01 RX ORDER — AZELASTINE 1 MG/ML
2 SPRAY, METERED NASAL 2 TIMES DAILY
Qty: 30 ML | Refills: 6 | Status: SHIPPED | OUTPATIENT
Start: 2019-01-01 | End: 2019-01-01

## 2019-01-01 RX ORDER — PANTOPRAZOLE SODIUM 40 MG/1
40 TABLET, DELAYED RELEASE ORAL DAILY
Qty: 30 TABLET | Refills: 5 | Status: SHIPPED | OUTPATIENT
Start: 2019-01-01 | End: 2019-01-01 | Stop reason: SDUPTHER

## 2019-01-01 RX ORDER — PANTOPRAZOLE SODIUM 40 MG/1
40 TABLET, DELAYED RELEASE ORAL DAILY
Qty: 90 TABLET | Refills: 0 | Status: SHIPPED | OUTPATIENT
Start: 2019-01-01

## 2019-01-01 RX ORDER — LEVETIRACETAM 250 MG/1
TABLET ORAL
Qty: 180 TABLET | Refills: 1 | Status: SHIPPED | OUTPATIENT
Start: 2019-01-01 | End: 2019-01-01 | Stop reason: SDUPTHER

## 2019-01-01 RX ORDER — PANTOPRAZOLE SODIUM 40 MG/1
40 TABLET, DELAYED RELEASE ORAL DAILY
Qty: 90 TABLET | Refills: 3 | OUTPATIENT
Start: 2019-01-01

## 2019-02-06 NOTE — TELEPHONE ENCOUNTER
Pt insurance will no longer cover Olopatadine. Would you like to prescribe another medication?     Please advise

## 2019-02-06 NOTE — TELEPHONE ENCOUNTER
May try azelastine 1% nasal spray 2 sprays each nostril BID. Remove other medication. (Dx: allergic rhinitis)    (REMEMBER: link the diagnosis to the new medication)

## 2019-02-11 NOTE — PROGRESS NOTES
"02/11/2019    MEDICARE ANNUAL WELLNESS VISIT    Lianna Hawthorne is a 89 y.o. female who presents for SUBSEQUENT AWV & F/U ON CHRONIC MEDICAL PROBLEMS     Patient's general assessment of her health since a year ago:     - Compared to one year ago, she feels her physical health is about the same without significant change.    - Compared to one year ago, she feels her mental health is moderately worse.      HPI for other active medical problems:     Daughter (caregiver) has noticed increased difficulty with memory/cogntive function and confusion.   She is followed closely by Dr. Layton for CRF.   Hypertension remains stable on amlodipine 10 mg qd.   On simvastatin 20 mg for hyperlipidemia.   On stable dose of levothyroxine.   Lab Results   Component Value Date    TSH 3.560 11/30/2017    TSH 3.020 04/25/2016    FREET4 1.21 11/30/2017    FREET4 1.22 04/25/2016          * The required components of Health Risk Assessment (HRA) that were completed by the patient and/or my staff are contained within this note and in the scanned documents titled \"Health Risk Assessment\" within the media section of the patient's chart in [facility].       HISTORY    Recent Hospitalizations:    Recent hospitalization? : No    If YES, location, date, and diagnoses:     · Location:   · Date:   · Principle Discharge Dx:   · Secondary Dx:       Patient Care Team:    Patient Care Team:  Mike Ricketts MD as PCP - General  Jl Layton MD as PCP - Brice Srinivasan MD as Consulting Physician (Dermatology)  Chad Marino MD as Consulting Physician (Ophthalmology)  Jl Layton MD as Consulting Physician (Nephrology)      Allergies:  Sulfa antibiotics; Azithromycin; Risedronate sodium; Ciprofloxacin hcl; and Hydrocodone-acetaminophen    Medications:  Outpatient Medications Prior to Visit   Medication Sig Dispense Refill   • acetaminophen (TYLENOL) 325 MG tablet Take 650 mg by mouth every 6 (six) hours.     • amLODIPine " (NORVASC) 10 MG tablet TAKE 1 TABLET NIGHTLY 90 tablet 3   • B Complex Vitamins (VITAMIN B COMPLEX) tablet Take 1 tablet by mouth Daily.     • Cranberry 1000 MG capsule Take 500 mg by mouth Every Morning.     • fluticasone (FLONASE) 50 MCG/ACT nasal spray INSTILL 2 SPRAYS INTO EACH NOSTRIL DAILY. 16 mL 5   • furosemide (LASIX) 40 MG tablet Take 40 mg by mouth Every Morning.     • hyoscyamine (LEVBID) 0.375 MG 12 hr tablet TAKE 1 TABLET BY MOUTH TWICE A  tablet 1   • levETIRAcetam (KEPPRA) 250 MG tablet TAKE 1 TABLET TWICE DAILY. 180 tablet 1   • levothyroxine (SYNTHROID, LEVOTHROID) 25 MCG tablet TAKE 1 TABLET BY MOUTH EVERY MORNING. 90 tablet 0   • mirtazapine (REMERON) 15 MG tablet TAKE 1 TABLET BY MOUTH EVERY DAY IN THE EVENING 30 tablet 2   • Multiple Vitamins-Minerals (VITEYES AREDS ADVANCED) capsule Take 1 tablet by mouth Daily.     • olopatadine (PATANASE) 0.6 % solution nasal solution by Each Nare route 2 (Two) Times a Day.     • pantoprazole (PROTONIX) 40 MG EC tablet TAKE 1 TABLET BY MOUTH DAILY. 30 tablet 5   • Probiotic Product (PROBIOTIC DAILY PO) Take 1 tablet by mouth Every Morning.     • simvastatin (ZOCOR) 20 MG tablet TAKE 1 TABLET EVERY NIGHT 90 tablet 3   • azelastine (ASTELIN) 0.1 % nasal spray 2 sprays into the nostril(s) as directed by provider 2 (Two) Times a Day. Use in each nostril as directed 30 mL 6   • calcitriol (ROCALTROL) 0.25 MCG capsule Take 0.25 mcg by mouth Daily.     • cefdinir (OMNICEF) 300 MG capsule Take 1 capsule by mouth 2 (Two) Times a Day. 14 capsule 0   • hyoscyamine (LEVBID) 0.375 MG 12 hr tablet Take 0.375 mg by mouth Every 12 (Twelve) Hours As Needed for Cramping.     • simvastatin (ZOCOR) 20 MG tablet Take 20 mg by mouth Every Night.       No facility-administered medications prior to visit.        PFSH:     The following portions of the patient's history were reviewed and updated as appropriate: Allergies / Current Medications / Past Medical History / Surgical  History / Social History / Family History    Problem List:  Patient Active Problem List   Diagnosis   • Atopic rhinitis   • Anemia   • Stenosis of carotid artery   • Chronic kidney disease   • Depression   • Gastroesophageal reflux disease   • Hypertension   • Hyperlipidemia   • Hypothyroidism   • Irritable bowel syndrome with diarrhea   • Restless legs syndrome   • Vitamin D deficiency   • Basal cell carcinoma of skin of nose       Past Medical History:  Past Medical History:   Diagnosis Date   • Anemia    • Arthritis    • Basal cell carcinoma of ala nasi 11/7/2017    BASAL CELL CANCER RT SIDE NOSE WITH 15 RADIATION TX'S  NO SURGERY DONE   • Cerebral hemorrhage (CMS/HCC) 8/15/2014   • Closed fracture of proximal end of humerus 5/15/2015   • Depression    • Diverticulosis of intestine 3/10/2016   • GERD (gastroesophageal reflux disease)    • Hyperlipidemia    • Hypertension    • Irritable bowel syndrome    • Osteopenia    • Renal insufficiency     STAGE 1V   • Seizures (CMS/HCC)    • Stroke (CMS/HCC)     HEMORRATIC STROKE JAN 2013   • Visual impairment     magular degeneration       Past Surgical History:  Past Surgical History:   Procedure Laterality Date   • BROW LIFT N/A 1/9/2018    Procedure:  MID FACE LIFT;  Surgeon: Sanya Rivero MD;  Location: [facility];  Service:    • CHOLECYSTECTOMY     • COLONOSCOPY     • ECTROPION REPAIR Right 1/9/2018    Procedure: RIGHT LOWER LID ECTROPION REPAIR;  Surgeon: Sanya Rivero MD;  Location: [facility];  Service:    • EYE SURGERY      cataracts removed   • HYSTERECTOMY     • TONSILLECTOMY         Social History:  Social History     Socioeconomic History   • Marital status:      Spouse name: Not on file   • Number of children: 3   • Years of education: Not on file   • Highest education level: Not on file   Occupational History   • Occupation: Retired     Comment:    Tobacco Use   • Smoking status: Never Smoker   • Smokeless  "tobacco: Never Used   Substance and Sexual Activity   • Alcohol use: No   • Drug use: No   Social History Narrative    L[place] apartment [place].       Family History:  Family History   Problem Relation Age of Onset   • Diabetes Brother    • Heart disease Mother    • Breast cancer Mother    • No Known Problems Father    • Kidney disease Other    • Breast cancer Other    • Diabetes Other    • Hypertension Other    • Malig Hyperthermia Neg Hx          PATIENT ASSESSMENT    Vitals:  /62   Pulse 98   Temp 98.3 °F (36.8 °C)   Ht 165.1 cm (65\")   Wt 60.3 kg (133 lb)   SpO2 95%   BMI 22.13 kg/m²   BP Readings from Last 3 Encounters:   02/11/19 114/62   01/16/19 153/71   10/08/18 143/71     Wt Readings from Last 3 Encounters:   02/11/19 60.3 kg (133 lb)   01/16/19 59 kg (130 lb)   10/08/18 66.2 kg (146 lb)      Body mass index is 22.13 kg/m².    Pain Score    02/11/19 1102   PainSc:   4   PainLoc: Knee         Review of Systems:    Review of Systems  Constitutional neg  Resp neg  CV neg       Physical Exam:    Physical Exam  Constitutional  No distress, frail appearing   Cardiovascular Rate  normal . Rhythm: regular . Heart sounds:  Normal. No edema.   Pulmonary/Chest  Effort normal. Breath sounds:  normal  Psychiatric  Alert. Judgment and thought content overall intact. Mood normal     Reviewed Data:    Labs:   Lab Results   Component Value Date     (H) 01/05/2018    K 5.0 01/05/2018    AST 24 04/25/2016    ALT 12 04/25/2016    BUN 60 (H) 01/05/2018    CREATININE 2.42 (H) 01/05/2018    CREATININE 1.94 (H) 05/10/2016    CREATININE 1.86 (H) 04/25/2016    EGFRIFNONA 19 (L) 01/05/2018    EGFRIFAFRI 26 (L) 05/10/2016       Lab Results   Component Value Date    GLU 74 05/10/2016    GLU 87 04/25/2016    GLU 95 05/05/2015       Lab Results   Component Value Date    LDL 71 11/30/2017    LDL 92 04/25/2016    HDL 51 11/30/2017    TRIG 156 (H) 11/30/2017    CHOLHDLRATIO 3.00 11/30/2017       Lab Results "   Component Value Date    TSH 3.560 11/30/2017    FREET4 1.21 11/30/2017          Lab Results   Component Value Date    WBC 7.29 01/05/2018    HGB 11.4 (L) 01/05/2018    HGB 12.0 05/05/2015     01/05/2018                 No results found for: PSA    Imaging:          Medical Tests:          Screening for Glaucoma:  Previous screening for glaucoma?: Yes      Hearing Loss Screen:  Finger Rub Hearing Test (right ear): passed and uses hearing aid  Finger Rub Hearing Test (left ear): passed and uses hearing aid      Urinary Incontinence Screen:  Episodes of urinary incontinence? : Yes      Depression Screen:  PHQ-2/PHQ-9 Depression Screening 2/11/2019   Little interest or pleasure in doing things 0   Feeling down, depressed, or hopeless 1   Total Score 1        PHQ-2: N/A (Has diagnosis of depression and is on treatment)    PHQ-9:        FUNCTIONAL, FALL RISK, & COGNITIVE SCREENING (Components below):    DATA:    Functional & Cognitive Status 2/11/2019   Do you have difficulty preparing food and eating? No   Do you have difficulty bathing yourself, getting dressed or grooming yourself? No   Do you have difficulty using the toilet? No   Do you have difficulty moving around from place to place? No   Do you have trouble with steps or getting out of a bed or a chair? No   In the past year have you fallen or experienced a near fall? Yes   Current Diet Well Balanced Diet   Dental Exam Up to date   Eye Exam Not up to date   Exercise (times per week) 7 times per week   Current Exercise Activities Include Walking   Do you need help using the phone?  No   Are you deaf or do you have serious difficulty hearing?  No   Do you need help with transportation? Yes   Do you need help shopping? Yes   Do you need help preparing meals?  Yes   Do you need help with housework?  Yes   Do you need help with laundry? Yes   Do you need help taking your medications? No   Do you need help managing money? Yes   Do you ever drive or ride in a  car without wearing a seat belt? No   Do you have difficulty concentrating, remembering or making decisions? Yes       Fall Risk Assessment  Fallen in past 6 months: 5--> Yes  Mental Status: 1--> confused  Mobility: 2--> Requires assistance- transfer, walker, etc.  Medications: 5--> Diuretics  Total Fall Risk Score: 15    A) Assessment of Functional Ability:  (Assessment of ability to perform ADL's (showering/bathing, using toilet, dressing, feeding self, moving self around) and IADL's (use telephone, shop, prepare food, housekeep, do laundry, transport independently, take medications independently, and handle finances)    Degree of functional impairment: MODERATE (based on assessment noted above)      B) Assessment of Fall Risk:     - Fall within the last year? : No   - Feel unsteady when standing or walking? : No   - Worry about falling? : Yes    Need for further evaluation of gait, strength, and balance? : Yes    Timed Up and Go (TUG):   (>= 12 seconds indicates high risk for falling)    Observable abnormalities included: slow tentative pace       C. Assessment of Cognitive Function:    Mini-Cog Test:     1) Registration (3 objects): Yes   2) Number of objects recalled: 2   3) Clock Draw: Passed? : No       Further evaluation required? 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 Will need to follow. No medication at this time due to age and complains of-morbid medical conditions.     COUNSELING    A. Identification of Health Risk Factors:    Risk factors include: cardiovascular risk factors, polypharmacy, cognitive impairment, increased fall risk, depression / other psychiatric problems and incontinence      B. Age-Appropriate Screening Schedule:  (Refer to the list below for future screening recommendations based on patient's age, sex and/or medical conditions. Orders for these recommended tests are listed in the plan section. The patient has been provided with a written  plan)    Health Maintenance Topics  Health Maintenance   Topic Date Due   • ZOSTER VACCINE (2 of 3) 02/20/2020 (Originally 1/3/2012)   • TDAP/TD VACCINES (2 - Td) 02/09/2025   • INFLUENZA VACCINE  Completed   • PNEUMOCOCCAL VACCINES (65+ LOW/MEDIUM RISK)  Completed   • LIPID PANEL  Discontinued   • DXA SCAN  Discontinued       Health Maintenance Topics Due or Over-Due  There are no preventive care reminders to display for this patient.      C. Advanced Care Planning:    has an advanced directive which is on file and has a medical power of       D. Patient Self-Management and Personalized Health Advice:    She has been provided with personalized counseling/information (including brochures/handouts) about:     -- reducing risk for cardiovascular disease (heart, stroke, vascular), fall prevention, prevalence and symptoms of dementia with progressive aging, mental health concerns (depression/anxiety), managing depression/anxiety and polypharmacy concerns, side effects of medications      She has been recommended for the following preventative services which has been performed today, will be ordered today or ordered/performed on upcoming follow-up visit:     -- counseling for cardiovascular disease risk reduction, fall risk assessment / plan of care completed, vaccination for Shingrix administered  (or recommended at pharmacy), vaccination for Hepatitis A administered (or recommended at pharmacy)      E. Miscellaneous Items:    -Aspirin use counseling: Does not need ASA (and currently is not on it)    -Discussed BMI with her. The BMI is in the acceptable range    -Reviewed use of high risk medication in the elderly: YES    -Reviewed for potential of harmful drug interactions in the elderly: YES      IV. WRAP-UP    Assessment & Plan:    1) MEDICARE ANNUAL WELLNESS VISIT    2) OTHER MEDICAL CONDITIONS ADDRESSED TODAY:              Problem List Items Addressed This Visit        High    Hypertension (Chronic)     Overview     Stable. Continue amlodipine 10 mg nightly.          Relevant Medications    furosemide (LASIX) 40 MG tablet    amLODIPine (NORVASC) 10 MG tablet       Medium    Hypothyroidism (Chronic)    Overview     Stable. Continue current dose of levothyroxine 25 mcg.           Relevant Medications    levothyroxine (SYNTHROID, LEVOTHROID) 25 MCG tablet    Other Relevant Orders    TSH+Free T4       Low    Chronic kidney disease (Chronic)    Overview     *Calin    Continue f/u with nephrologist.          Relevant Medications    furosemide (LASIX) 40 MG tablet    Other Relevant Orders    Comprehensive Metabolic Panel    Depression (Chronic)    Overview     Stable. Continue mirtazapine 15 mg qHS.          Relevant Medications    mirtazapine (REMERON) 15 MG tablet    Gastroesophageal reflux disease (Chronic)    Current Assessment & Plan     Start raniditine 75 mg OTC qHS. Continue pantoprazole 40 mg qd.   Dietary modifications discussed.          Relevant Medications    pantoprazole (PROTONIX) 40 MG EC tablet    hyoscyamine (LEVBID) 0.375 MG 12 hr tablet    Hyperlipidemia (Chronic)    Overview     Stable. Continue simvastatin 20 mg daily.          Relevant Medications    simvastatin (ZOCOR) 20 MG tablet    Other Relevant Orders    Lipid Panel With / Chol / HDL Ratio    Comprehensive Metabolic Panel      Other Visit Diagnoses     Medicare annual wellness visit, subsequent    -  Primary                    Orders Placed This Encounter   Procedures   • Lipid Panel With / Chol / HDL Ratio   • Comprehensive Metabolic Panel   • TSH+Free T4       Discussion / Summary:        Medications as of TODAY:              Current Outpatient Medications   Medication Sig Dispense Refill   • acetaminophen (TYLENOL) 325 MG tablet Take 650 mg by mouth every 6 (six) hours.     • amLODIPine (NORVASC) 10 MG tablet TAKE 1 TABLET NIGHTLY 90 tablet 3   • B Complex Vitamins (VITAMIN B COMPLEX) tablet Take 1 tablet by mouth Daily.     • Cranberry  1000 MG capsule Take 500 mg by mouth Every Morning.     • fluticasone (FLONASE) 50 MCG/ACT nasal spray INSTILL 2 SPRAYS INTO EACH NOSTRIL DAILY. 16 mL 5   • furosemide (LASIX) 40 MG tablet Take 40 mg by mouth Every Morning.     • hyoscyamine (LEVBID) 0.375 MG 12 hr tablet TAKE 1 TABLET BY MOUTH TWICE A  tablet 1   • levETIRAcetam (KEPPRA) 250 MG tablet TAKE 1 TABLET TWICE DAILY. 180 tablet 1   • levothyroxine (SYNTHROID, LEVOTHROID) 25 MCG tablet TAKE 1 TABLET BY MOUTH EVERY MORNING. 90 tablet 0   • mirtazapine (REMERON) 15 MG tablet TAKE 1 TABLET BY MOUTH EVERY DAY IN THE EVENING 30 tablet 2   • Multiple Vitamins-Minerals (VITEYES AREDS ADVANCED) capsule Take 1 tablet by mouth Daily.     • olopatadine (PATANASE) 0.6 % solution nasal solution by Each Nare route 2 (Two) Times a Day.     • pantoprazole (PROTONIX) 40 MG EC tablet TAKE 1 TABLET BY MOUTH DAILY. 30 tablet 5   • Probiotic Product (PROBIOTIC DAILY PO) Take 1 tablet by mouth Every Morning.     • simvastatin (ZOCOR) 20 MG tablet TAKE 1 TABLET EVERY NIGHT 90 tablet 3     No current facility-administered medications for this visit.          FOLLOW-UP:            Return in about 5 months (around 7/11/2019) for Reassess chronic medical problems.              Future Appointments   Date Time Provider Department Center   6/11/2019  1:30 PM Mike Ricketts MD [facility] None         ______________________________________________________________________      A printed After Visit Summary (AVS) including the Personalized Prevention  Plan Services (PPPS) was given to the patient at check-out today.     Educational Handouts    Strong & Stable / Balance / Physical Activity / Pain / Depression /  Forgetfulness / Healthy Eating / Alcohol / Smoking /  Medicine / Sexuality / Scams     **Dragon Disclaimer:   Much of this encounter note is an electronic transcription/translation of spoken language to printed text. The electronic translation of spoken language may permit  erroneous, or at times, nonsensical words or phrases to be inadvertently transcribed. Although I have reviewed the note for such errors, some may still exist.     This template was created by Cb Ricketts MD.

## 2019-02-11 NOTE — TELEPHONE ENCOUNTER
Patient is in need of a referral for ENT to get her ears cleaned out. She is to be getting a new hearing aid in 2 weeks and the wax needs to be cleaned out.     Thank you

## 2019-02-11 NOTE — ASSESSMENT & PLAN NOTE
Start raniditine 75 mg OTC qHS. Continue pantoprazole 40 mg qd.   Dietary modifications discussed.

## 2019-08-26 NOTE — OUTREACH NOTE
Patient Outreach Note    Spoke with daughter Katiuska Smith following pt's ED visit 8/22/19 for urinary retention and placement of bradley catheter. Ms Smith reports no problems with the catheter and is draining clear yellow urine. She assures pt is drinking adequate amounts of water daily. Mrs Hawthorne lives at [facility], her daughters alternate staying with her or has hired help there 24/7.  Pt uses a walker, daughter transports. F/U appts are scheduled:  Nephrology 8/28 (every 3 months), Urology 8/29, and PCP 8/30.  AWV is current, no gaps in care, has advance directives, MyChart is active. Voiced no further needs, appreciated the call.     Boone Torrez RN    8/26/2019, 5:29 PM

## 2019-08-26 NOTE — OUTREACH NOTE
Care Coordination Assessment    Documented/Reviewed By:  Boone Torrez RN Date/time:  8/26/2019  5:27 PM   Assessment completed with:  children (Comment: daughter Katiuska Smith)  Living arrangement:  alone (Comment: Has 24/7 caregivers)  Support system:  children  Type of residence:  apartment (Comment: [facility] [address])  Equipment used at home:  walker  Inadequate nutrition:  No  Medication adherence problem:  No  Experiencing side effects from current medications:  No  History of fall(s) in last 6 months:  No  Family aware of the patient's advance care planning wishes:  Yes

## 2019-10-25 NOTE — TELEPHONE ENCOUNTER
LOV 02.11.19 / YOLANDA    06.11.19 / YOLANDA / CANCELED    08.30.19 / GISSELL / CANCELED   NOV NONE LISTED     LM FOR PT TO RETURN CALL AND SCHEDULE UPCOMING APPT.

## 2024-11-20 NOTE — TELEPHONE ENCOUNTER
Please advise this looks like it was previous ongoing message.   Hpi Title: Evaluation of Skin Lesions

## (undated) DEVICE — SPNG LAP 18X18IN LF STRL PK/5

## (undated) DEVICE — ELECTRD BLD EZ CLN MOD 2.5IN

## (undated) DEVICE — NDL HYPO PRECISIONGLIDE REG 25G 1 1/2

## (undated) DEVICE — IMMOB HD UNIV CLR DISP

## (undated) DEVICE — PK ENT 40

## (undated) DEVICE — COMB 7IN BLK STRL

## (undated) DEVICE — WIPE INST MEROCEL

## (undated) DEVICE — SWABSTK SKINPREP PVPI LF PK/3

## (undated) DEVICE — GLV SURG BIOGEL SENSR LTX PF SZ7.5

## (undated) DEVICE — INTENDED FOR TISSUE SEPARATION, AND OTHER PROCEDURES THAT REQUIRE A SHARP SURGICAL BLADE TO PUNCTURE OR CUT.: Brand: BARD-PARKER ® CARBON RIB-BACK BLADES

## (undated) DEVICE — SUT VIC 5/0 P3 18IN J493G

## (undated) DEVICE — STERILE COTTON TIP 6IN 10PK: Brand: MEDLINE

## (undated) DEVICE — SUT GUT PLN FAST ABS 5/0 PC1 18IN 1915G

## (undated) DEVICE — CROUCH CORNEAL PROTECTOR: Brand: BAUSCH + LOMB